# Patient Record
Sex: FEMALE | Race: WHITE | Employment: UNEMPLOYED | URBAN - METROPOLITAN AREA
[De-identification: names, ages, dates, MRNs, and addresses within clinical notes are randomized per-mention and may not be internally consistent; named-entity substitution may affect disease eponyms.]

---

## 2018-01-01 ENCOUNTER — HOSPITAL ENCOUNTER (OUTPATIENT)
Dept: INPATIENT UNIT | Age: 83
Setting detail: THERAPIES SERIES
Discharge: HOME | End: 2018-11-29
Payer: COMMERCIAL

## 2018-01-01 ENCOUNTER — TELEPHONE (OUTPATIENT)
Dept: GASTROENTEROLOGY | Age: 83
End: 2018-01-01

## 2018-01-01 ENCOUNTER — OFFICE VISIT (OUTPATIENT)
Dept: PRIMARY CARE CLINIC | Age: 83
End: 2018-01-01
Payer: COMMERCIAL

## 2018-01-01 ENCOUNTER — TELEPHONE (OUTPATIENT)
Dept: PRIMARY CARE CLINIC | Age: 83
End: 2018-01-01

## 2018-01-01 ENCOUNTER — HOSPITAL ENCOUNTER (OUTPATIENT)
Dept: INPATIENT UNIT | Age: 83
Setting detail: THERAPIES SERIES
Discharge: HOME | End: 2018-12-13
Payer: COMMERCIAL

## 2018-01-01 ENCOUNTER — HOSPITAL ENCOUNTER (INPATIENT)
Age: 83
LOS: 5 days | Discharge: HOME HEALTH | DRG: 682 | End: 2018-12-24
Attending: EMERGENCY MEDICINE | Admitting: INTERNAL MEDICINE
Payer: MEDICARE

## 2018-01-01 ENCOUNTER — APPOINTMENT (OUTPATIENT)
Dept: ULTRASOUND IMAGING | Age: 83
DRG: 682 | End: 2018-01-01
Payer: MEDICARE

## 2018-01-01 VITALS
BODY MASS INDEX: 15.79 KG/M2 | WEIGHT: 92 LBS | DIASTOLIC BLOOD PRESSURE: 60 MMHG | RESPIRATION RATE: 15 BRPM | OXYGEN SATURATION: 94 % | SYSTOLIC BLOOD PRESSURE: 90 MMHG | HEART RATE: 70 BPM

## 2018-01-01 VITALS
SYSTOLIC BLOOD PRESSURE: 132 MMHG | BODY MASS INDEX: 17.69 KG/M2 | OXYGEN SATURATION: 96 % | HEIGHT: 64 IN | RESPIRATION RATE: 16 BRPM | HEART RATE: 64 BPM | TEMPERATURE: 98.8 F | WEIGHT: 103.62 LBS | DIASTOLIC BLOOD PRESSURE: 68 MMHG

## 2018-01-01 VITALS
RESPIRATION RATE: 18 BRPM | HEART RATE: 70 BPM | OXYGEN SATURATION: 99 % | SYSTOLIC BLOOD PRESSURE: 143 MMHG | TEMPERATURE: 97.2 F | DIASTOLIC BLOOD PRESSURE: 65 MMHG

## 2018-01-01 VITALS
HEART RATE: 73 BPM | DIASTOLIC BLOOD PRESSURE: 62 MMHG | SYSTOLIC BLOOD PRESSURE: 112 MMHG | RESPIRATION RATE: 16 BRPM | WEIGHT: 99 LBS | OXYGEN SATURATION: 96 %

## 2018-01-01 VITALS
HEART RATE: 71 BPM | DIASTOLIC BLOOD PRESSURE: 50 MMHG | OXYGEN SATURATION: 99 % | TEMPERATURE: 98.8 F | SYSTOLIC BLOOD PRESSURE: 112 MMHG | RESPIRATION RATE: 16 BRPM

## 2018-01-01 DIAGNOSIS — E87.5 ACUTE HYPERKALEMIA: ICD-10-CM

## 2018-01-01 DIAGNOSIS — D64.9 ANEMIA, UNSPECIFIED TYPE: ICD-10-CM

## 2018-01-01 DIAGNOSIS — N17.9 ACUTE RENAL FAILURE, UNSPECIFIED ACUTE RENAL FAILURE TYPE (HCC): Primary | ICD-10-CM

## 2018-01-01 DIAGNOSIS — R64 CACHEXIA (HCC): ICD-10-CM

## 2018-01-01 DIAGNOSIS — K92.2 GASTROINTESTINAL HEMORRHAGE, UNSPECIFIED GASTROINTESTINAL HEMORRHAGE TYPE: Primary | ICD-10-CM

## 2018-01-01 LAB
-: ABNORMAL
-: ABNORMAL
ABO/RH: NORMAL
ABSOLUTE EOS #: 0.3 K/UL (ref 0–0.4)
ABSOLUTE IMMATURE GRANULOCYTE: ABNORMAL K/UL (ref 0–0.3)
ABSOLUTE LYMPH #: 1 K/UL (ref 1–4.8)
ABSOLUTE MONO #: 1.2 K/UL (ref 0.1–1.3)
ALBUMIN SERPL-MCNC: 2.1 G/DL (ref 3.5–5.2)
ALBUMIN SERPL-MCNC: 2.3 G/DL (ref 3.5–5.2)
ALBUMIN SERPL-MCNC: 2.4 G/DL (ref 3.5–5.2)
ALBUMIN SERPL-MCNC: 2.4 G/DL (ref 3.5–5.2)
ALBUMIN SERPL-MCNC: 2.5 G/DL (ref 3.5–5.2)
ALBUMIN SERPL-MCNC: 2.7 G/DL (ref 3.5–5.2)
ALBUMIN/GLOBULIN RATIO: ABNORMAL (ref 1–2.5)
ALP BLD-CCNC: 101 U/L (ref 35–104)
ALP BLD-CCNC: 74 U/L (ref 35–104)
ALP BLD-CCNC: 82 U/L (ref 35–104)
ALP BLD-CCNC: 82 U/L (ref 35–104)
ALP BLD-CCNC: 83 U/L (ref 35–104)
ALP BLD-CCNC: 84 U/L (ref 35–104)
ALT SERPL-CCNC: 6 U/L (ref 5–33)
ALT SERPL-CCNC: 7 U/L (ref 5–33)
ALT SERPL-CCNC: 7 U/L (ref 5–33)
ALT SERPL-CCNC: 8 U/L (ref 5–33)
ALT SERPL-CCNC: 8 U/L (ref 5–33)
ALT SERPL-CCNC: 9 U/L (ref 5–33)
AMORPHOUS: ABNORMAL
AMORPHOUS: ABNORMAL
ANCA MYELOPEROXIDASE: 8 AU/ML
ANCA PROTEINASE 3: 11 AU/ML
ANION GAP SERPL CALCULATED.3IONS-SCNC: 10 MMOL/L (ref 9–17)
ANION GAP SERPL CALCULATED.3IONS-SCNC: 11 MMOL/L (ref 9–17)
ANION GAP SERPL CALCULATED.3IONS-SCNC: 9 MMOL/L (ref 9–17)
ANTI-NUCLEAR ANTIBODY (ANA): NEGATIVE
ANTIBODY SCREEN: NEGATIVE
ARM BAND NUMBER: NORMAL
AST SERPL-CCNC: 21 U/L
AST SERPL-CCNC: 21 U/L
AST SERPL-CCNC: 22 U/L
AST SERPL-CCNC: 22 U/L
AST SERPL-CCNC: 23 U/L
AST SERPL-CCNC: 24 U/L
BACTERIA: ABNORMAL
BACTERIA: ABNORMAL
BASOPHILS # BLD: 1 % (ref 0–2)
BASOPHILS ABSOLUTE: 0.1 /ΜL
BASOPHILS ABSOLUTE: 0.1 K/UL (ref 0–0.2)
BASOPHILS RELATIVE PERCENT: 0.6 %
BILIRUB SERPL-MCNC: 0.19 MG/DL (ref 0.3–1.2)
BILIRUB SERPL-MCNC: 0.26 MG/DL (ref 0.3–1.2)
BILIRUB SERPL-MCNC: 0.27 MG/DL (ref 0.3–1.2)
BILIRUB SERPL-MCNC: 0.3 MG/DL (ref 0.3–1.2)
BILIRUB SERPL-MCNC: 0.32 MG/DL (ref 0.3–1.2)
BILIRUB SERPL-MCNC: 0.43 MG/DL (ref 0.3–1.2)
BILIRUBIN URINE: NEGATIVE
BILIRUBIN URINE: NEGATIVE
BLD PROD TYP BPU: NORMAL
BUN BLDV-MCNC: 33 MG/DL (ref 8–23)
BUN BLDV-MCNC: 41 MG/DL (ref 8–23)
BUN BLDV-MCNC: 52 MG/DL (ref 8–23)
BUN BLDV-MCNC: 60 MG/DL (ref 8–23)
BUN BLDV-MCNC: 75 MG/DL (ref 8–23)
BUN BLDV-MCNC: 83 MG/DL (ref 8–23)
BUN BLDV-MCNC: 84 MG/DL
BUN/CREAT BLD: ABNORMAL (ref 9–20)
CALCIUM SERPL-MCNC: 7.7 MG/DL (ref 8.6–10.4)
CALCIUM SERPL-MCNC: 7.7 MG/DL (ref 8.6–10.4)
CALCIUM SERPL-MCNC: 7.8 MG/DL (ref 8.6–10.4)
CALCIUM SERPL-MCNC: 7.9 MG/DL (ref 8.6–10.4)
CALCIUM SERPL-MCNC: 7.9 MG/DL (ref 8.6–10.4)
CALCIUM SERPL-MCNC: 8.3 MG/DL
CALCIUM SERPL-MCNC: 8.3 MG/DL (ref 8.6–10.4)
CASTS UA: ABNORMAL /LPF
CASTS UA: ABNORMAL /LPF
CHLORIDE BLD-SCNC: 108 MMOL/L (ref 98–107)
CHLORIDE BLD-SCNC: 109 MMOL/L
CHLORIDE BLD-SCNC: 109 MMOL/L (ref 98–107)
CHLORIDE BLD-SCNC: 111 MMOL/L (ref 98–107)
CHLORIDE BLD-SCNC: 112 MMOL/L (ref 98–107)
CHLORIDE BLD-SCNC: 113 MMOL/L (ref 98–107)
CHLORIDE BLD-SCNC: 113 MMOL/L (ref 98–107)
CHLORIDE, UR: 90 MMOL/L
CO2: 16 MMOL/L (ref 20–31)
CO2: 18 MMOL/L (ref 20–31)
CO2: 19 MMOL/L (ref 20–31)
CO2: 20 MMOL/L (ref 20–31)
CO2: 23 MMOL/L
CO2: 23 MMOL/L (ref 20–31)
CO2: 23 MMOL/L (ref 20–31)
COLOR: YELLOW
COLOR: YELLOW
COMMENT UA: ABNORMAL
COMMENT UA: ABNORMAL
CREAT SERPL-MCNC: 2.47 MG/DL (ref 0.5–0.9)
CREAT SERPL-MCNC: 2.55 MG/DL (ref 0.5–0.9)
CREAT SERPL-MCNC: 2.99 MG/DL (ref 0.5–0.9)
CREAT SERPL-MCNC: 3.26 MG/DL (ref 0.5–0.9)
CREAT SERPL-MCNC: 3.81 MG/DL (ref 0.5–0.9)
CREAT SERPL-MCNC: 4.2 MG/DL
CREAT SERPL-MCNC: 4.24 MG/DL (ref 0.5–0.9)
CREATININE URINE: 20.6 MG/DL (ref 28–217)
CREATININE URINE: 33.5 MG/DL (ref 28–217)
CROSSMATCH RESULT: NORMAL
CRYSTALS, UA: ABNORMAL /HPF
CRYSTALS, UA: ABNORMAL /HPF
DATE, STOOL #1: ABNORMAL
DATE, STOOL #2: ABNORMAL
DATE, STOOL #3: ABNORMAL
DIFFERENTIAL TYPE: ABNORMAL
DISPENSE STATUS BLOOD BANK: NORMAL
EKG ATRIAL RATE: 72 BPM
EKG P AXIS: 81 DEGREES
EKG P-R INTERVAL: 172 MS
EKG Q-T INTERVAL: 392 MS
EKG QRS DURATION: 66 MS
EKG QTC CALCULATION (BAZETT): 429 MS
EKG R AXIS: 54 DEGREES
EKG T AXIS: 60 DEGREES
EKG VENTRICULAR RATE: 72 BPM
EOSINOPHILS ABSOLUTE: 0.2 /ΜL
EOSINOPHILS RELATIVE PERCENT: 2.7 %
EOSINOPHILS RELATIVE PERCENT: 3 % (ref 0–4)
EPITHELIAL CELLS UA: ABNORMAL /HPF
EPITHELIAL CELLS UA: ABNORMAL /HPF
EXPIRATION DATE: NORMAL
FERRITIN: 313 UG/L (ref 13–150)
FOLATE: 12.1 NG/ML
GBM ANTIBODY IGG: 1 AU/ML
GFR AFRICAN AMERICAN: 12 ML/MIN
GFR AFRICAN AMERICAN: 13 ML/MIN
GFR AFRICAN AMERICAN: 16 ML/MIN
GFR AFRICAN AMERICAN: 18 ML/MIN
GFR AFRICAN AMERICAN: 21 ML/MIN
GFR AFRICAN AMERICAN: 22 ML/MIN
GFR CALCULATED: 10
GFR NON-AFRICAN AMERICAN: 10 ML/MIN
GFR NON-AFRICAN AMERICAN: 11 ML/MIN
GFR NON-AFRICAN AMERICAN: 13 ML/MIN
GFR NON-AFRICAN AMERICAN: 15 ML/MIN
GFR NON-AFRICAN AMERICAN: 18 ML/MIN
GFR NON-AFRICAN AMERICAN: 18 ML/MIN
GFR SERPL CREATININE-BSD FRML MDRD: ABNORMAL ML/MIN/{1.73_M2}
GLUCOSE BLD-MCNC: 141 MG/DL
GLUCOSE BLD-MCNC: 69 MG/DL (ref 70–99)
GLUCOSE BLD-MCNC: 71 MG/DL (ref 70–99)
GLUCOSE BLD-MCNC: 72 MG/DL (ref 70–99)
GLUCOSE BLD-MCNC: 74 MG/DL (ref 70–99)
GLUCOSE BLD-MCNC: 77 MG/DL (ref 70–99)
GLUCOSE BLD-MCNC: 79 MG/DL (ref 70–99)
GLUCOSE URINE: NEGATIVE
GLUCOSE URINE: NEGATIVE
HAPTOGLOBIN: 247 MG/DL (ref 30–200)
HCT VFR BLD CALC: 20.5 % (ref 36–46)
HCT VFR BLD CALC: 20.9 % (ref 36–46)
HCT VFR BLD CALC: 22.2 % (ref 36–46)
HCT VFR BLD CALC: 24 % (ref 36–46)
HCT VFR BLD CALC: 24.3 % (ref 36–46)
HCT VFR BLD CALC: 24.9 % (ref 36–46)
HCT VFR BLD CALC: 25.1 % (ref 36–46)
HCT VFR BLD CALC: 25.5 % (ref 36–46)
HCT VFR BLD CALC: 25.9 % (ref 36–46)
HCT VFR BLD CALC: 26.9 % (ref 36–46)
HEMOCCULT SP1 STL QL: POSITIVE
HEMOCCULT SP2 STL QL: ABNORMAL
HEMOCCULT SP3 STL QL: ABNORMAL
HEMOGLOBIN: 6.9 G/DL (ref 12–16)
HEMOGLOBIN: 7 G/DL (ref 12–16)
HEMOGLOBIN: 7.1 G/DL (ref 12–16)
HEMOGLOBIN: 7.7 G/DL (ref 12–16)
HEMOGLOBIN: 8.1 G/DL (ref 12–16)
HEMOGLOBIN: 8.3 G/DL (ref 12–16)
HEMOGLOBIN: 8.4 G/DL (ref 12–16)
HEMOGLOBIN: 8.5 G/DL (ref 12–16)
HEMOGLOBIN: 8.7 G/DL (ref 12–16)
HEMOGLOBIN: 9.1 G/DL (ref 12–16)
IMMATURE GRANULOCYTES: ABNORMAL %
INR BLD: 1.1
IRON SATURATION: 65 % (ref 20–55)
IRON: 110 UG/DL (ref 37–145)
KETONES, URINE: NEGATIVE
KETONES, URINE: NEGATIVE
LEUKOCYTE ESTERASE, URINE: ABNORMAL
LEUKOCYTE ESTERASE, URINE: ABNORMAL
LYMPHOCYTES # BLD: 12 % (ref 24–44)
LYMPHOCYTES ABSOLUTE: 0.9 /ΜL
LYMPHOCYTES RELATIVE PERCENT: 10.6 %
MAGNESIUM: 1.4 MG/DL (ref 1.6–2.6)
MAGNESIUM: 1.9 MG/DL (ref 1.6–2.6)
MCH RBC QN AUTO: 32.6 PG (ref 26–34)
MCH RBC QN AUTO: 33.2 PG (ref 26–34)
MCH RBC QN AUTO: 33.4 PG (ref 26–34)
MCH RBC QN AUTO: 33.6 PG
MCH RBC QN AUTO: 33.6 PG (ref 26–34)
MCH RBC QN AUTO: 34.7 PG (ref 26–34)
MCH RBC QN AUTO: 34.8 PG (ref 26–34)
MCHC RBC AUTO-ENTMCNC: 30.9 G/DL
MCHC RBC AUTO-ENTMCNC: 32.2 G/DL (ref 31–37)
MCHC RBC AUTO-ENTMCNC: 32.8 G/DL (ref 31–37)
MCHC RBC AUTO-ENTMCNC: 33.7 G/DL (ref 31–37)
MCHC RBC AUTO-ENTMCNC: 33.7 G/DL (ref 31–37)
MCHC RBC AUTO-ENTMCNC: 34 G/DL (ref 31–37)
MCHC RBC AUTO-ENTMCNC: 34.5 G/DL (ref 31–37)
MCV RBC AUTO: 102.2 FL (ref 80–100)
MCV RBC AUTO: 103.4 FL (ref 80–100)
MCV RBC AUTO: 105.5 FL (ref 80–100)
MCV RBC AUTO: 108.7 FL
MCV RBC AUTO: 96.9 FL (ref 80–100)
MCV RBC AUTO: 97.4 FL (ref 80–100)
MCV RBC AUTO: 98.6 FL (ref 80–100)
MONOCYTES # BLD: 15 % (ref 1–7)
MONOCYTES ABSOLUTE: 0.9 /ΜL
MONOCYTES RELATIVE PERCENT: 9.8 %
MUCUS: ABNORMAL
MUCUS: ABNORMAL
NEUTROPHILS ABSOLUTE: 6.5 /ΜL
NEUTROPHILS RELATIVE PERCENT: 75.4 %
NITRITE, URINE: NEGATIVE
NITRITE, URINE: NEGATIVE
NRBC AUTOMATED: ABNORMAL PER 100 WBC
OTHER OBSERVATIONS UA: ABNORMAL
OTHER OBSERVATIONS UA: ABNORMAL
PDW BLD-RTO: 19.8 % (ref 11.5–14.9)
PDW BLD-RTO: 19.9 % (ref 11.5–14.9)
PDW BLD-RTO: 20.4 % (ref 11.5–14.9)
PDW BLD-RTO: 20.4 % (ref 11.5–14.9)
PDW BLD-RTO: 20.7 % (ref 11.5–14.9)
PDW BLD-RTO: 21.2 % (ref 11.5–14.9)
PDW BLD-RTO: ABNORMAL %
PH UA: 6 (ref 5–8)
PH UA: 7 (ref 5–8)
PLATELET # BLD: 171 K/UL (ref 150–450)
PLATELET # BLD: 175 K/UL (ref 150–450)
PLATELET # BLD: 192 K/UL (ref 150–450)
PLATELET # BLD: 200 K/UL (ref 150–450)
PLATELET # BLD: 205 K/UL (ref 150–450)
PLATELET # BLD: 228 K/ΜL
PLATELET # BLD: 250 K/UL (ref 150–450)
PLATELET ESTIMATE: ABNORMAL
PMV BLD AUTO: 6.9 FL (ref 6–12)
PMV BLD AUTO: 7.1 FL (ref 6–12)
PMV BLD AUTO: 7.1 FL (ref 6–12)
PMV BLD AUTO: 7.2 FL (ref 6–12)
PMV BLD AUTO: 7.3 FL (ref 6–12)
PMV BLD AUTO: 7.4 FL (ref 6–12)
PMV BLD AUTO: 9.9 FL
POTASSIUM SERPL-SCNC: 3.4 MMOL/L (ref 3.7–5.3)
POTASSIUM SERPL-SCNC: 3.6 MMOL/L (ref 3.7–5.3)
POTASSIUM SERPL-SCNC: 4.4 MMOL/L (ref 3.7–5.3)
POTASSIUM SERPL-SCNC: 4.7 MMOL/L (ref 3.7–5.3)
POTASSIUM SERPL-SCNC: 5.1 MMOL/L (ref 3.7–5.3)
POTASSIUM SERPL-SCNC: 5.9 MMOL/L
POTASSIUM SERPL-SCNC: 5.9 MMOL/L (ref 3.7–5.3)
POTASSIUM, UR: 16.7 MMOL/L
POTASSIUM, UR: 36.9 MMOL/L
PROTEIN UA: ABNORMAL
PROTEIN UA: ABNORMAL
PROTHROMBIN TIME: 11.1 SEC (ref 9.7–12)
RBC # BLD: 1.98 M/UL (ref 4–5.2)
RBC # BLD: 2.01 M/UL (ref 4–5.2)
RBC # BLD: 2.29 10^6/ΜL
RBC # BLD: 2.43 M/UL (ref 4–5.2)
RBC # BLD: 2.46 M/UL (ref 4–5.2)
RBC # BLD: 2.63 M/UL (ref 4–5.2)
RBC # BLD: 2.78 M/UL (ref 4–5.2)
RBC # BLD: ABNORMAL 10*6/UL
RBC UA: ABNORMAL /HPF
RBC UA: ABNORMAL /HPF
RENAL EPITHELIAL, UA: ABNORMAL /HPF
RENAL EPITHELIAL, UA: ABNORMAL /HPF
SEG NEUTROPHILS: 69 % (ref 36–66)
SEGMENTED NEUTROPHILS ABSOLUTE COUNT: 5.8 K/UL (ref 1.3–9.1)
SODIUM BLD-SCNC: 138 MMOL/L
SODIUM BLD-SCNC: 139 MMOL/L (ref 135–144)
SODIUM BLD-SCNC: 139 MMOL/L (ref 135–144)
SODIUM BLD-SCNC: 140 MMOL/L (ref 135–144)
SODIUM BLD-SCNC: 141 MMOL/L (ref 135–144)
SODIUM BLD-SCNC: 142 MMOL/L (ref 135–144)
SODIUM BLD-SCNC: 144 MMOL/L (ref 135–144)
SODIUM,UR: 74 MMOL/L
SODIUM,UR: 98 MMOL/L
SPECIFIC GRAVITY UA: 1.01 (ref 1–1.03)
SPECIFIC GRAVITY UA: 1.01 (ref 1–1.03)
THYROXINE, FREE: 0.86 NG/DL (ref 0.93–1.7)
TIME, STOOL #1: ABNORMAL
TIME, STOOL #2: ABNORMAL
TIME, STOOL #3: ABNORMAL
TOTAL CK: 28 U/L (ref 26–192)
TOTAL IRON BINDING CAPACITY: 170 UG/DL (ref 250–450)
TOTAL PROTEIN: 7.7 G/DL (ref 6.4–8.3)
TOTAL PROTEIN: 7.8 G/DL (ref 6.4–8.3)
TOTAL PROTEIN: 8.1 G/DL (ref 6.4–8.3)
TOTAL PROTEIN: 8.4 G/DL (ref 6.4–8.3)
TOTAL PROTEIN: 8.5 G/DL (ref 6.4–8.3)
TOTAL PROTEIN: 9.3 G/DL (ref 6.4–8.3)
TRANSFUSION STATUS: NORMAL
TRICHOMONAS: ABNORMAL
TRICHOMONAS: ABNORMAL
TROPONIN INTERP: ABNORMAL
TROPONIN INTERP: ABNORMAL
TROPONIN T: ABNORMAL NG/ML
TROPONIN T: ABNORMAL NG/ML
TROPONIN, HIGH SENSITIVITY: 89 NG/L (ref 0–14)
TROPONIN, HIGH SENSITIVITY: 96 NG/L (ref 0–14)
TSH SERPL DL<=0.05 MIU/L-ACNC: 8.39 MIU/L (ref 0.3–5)
TURBIDITY: ABNORMAL
TURBIDITY: CLEAR
UNIT DIVISION: 0
UNIT NUMBER: NORMAL
UNSATURATED IRON BINDING CAPACITY: 60 UG/DL (ref 112–347)
URINE HGB: ABNORMAL
URINE HGB: ABNORMAL
UROBILINOGEN, URINE: NORMAL
UROBILINOGEN, URINE: NORMAL
VITAMIN B-12: 1331 PG/ML (ref 232–1245)
WBC # BLD: 7.7 K/UL (ref 3.5–11)
WBC # BLD: 7.8 K/UL (ref 3.5–11)
WBC # BLD: 7.8 K/UL (ref 3.5–11)
WBC # BLD: 7.9 K/UL (ref 3.5–11)
WBC # BLD: 8.2 K/UL (ref 3.5–11)
WBC # BLD: 8.3 K/UL (ref 3.5–11)
WBC # BLD: 8.6 10^3/ML
WBC # BLD: ABNORMAL 10*3/UL
WBC UA: ABNORMAL /HPF
WBC UA: ABNORMAL /HPF
YEAST: ABNORMAL
YEAST: ABNORMAL

## 2018-01-01 PROCEDURE — 97535 SELF CARE MNGMENT TRAINING: CPT

## 2018-01-01 PROCEDURE — 85027 COMPLETE CBC AUTOMATED: CPT

## 2018-01-01 PROCEDURE — 84443 ASSAY THYROID STIM HORMONE: CPT

## 2018-01-01 PROCEDURE — 84300 ASSAY OF URINE SODIUM: CPT

## 2018-01-01 PROCEDURE — 36415 COLL VENOUS BLD VENIPUNCTURE: CPT

## 2018-01-01 PROCEDURE — 83735 ASSAY OF MAGNESIUM: CPT

## 2018-01-01 PROCEDURE — 99232 SBSQ HOSP IP/OBS MODERATE 35: CPT | Performed by: INTERNAL MEDICINE

## 2018-01-01 PROCEDURE — 82436 ASSAY OF URINE CHLORIDE: CPT

## 2018-01-01 PROCEDURE — G8978 MOBILITY CURRENT STATUS: HCPCS

## 2018-01-01 PROCEDURE — 99495 TRANSJ CARE MGMT MOD F2F 14D: CPT | Performed by: NURSE PRACTITIONER

## 2018-01-01 PROCEDURE — 1111F DSCHRG MED/CURRENT MED MERGE: CPT | Performed by: NURSE PRACTITIONER

## 2018-01-01 PROCEDURE — 80053 COMPREHEN METABOLIC PANEL: CPT

## 2018-01-01 PROCEDURE — 83550 IRON BINDING TEST: CPT

## 2018-01-01 PROCEDURE — 97110 THERAPEUTIC EXERCISES: CPT

## 2018-01-01 PROCEDURE — 2580000003 HC RX 258: Performed by: INTERNAL MEDICINE

## 2018-01-01 PROCEDURE — 86920 COMPATIBILITY TEST SPIN: CPT

## 2018-01-01 PROCEDURE — 97165 OT EVAL LOW COMPLEX 30 MIN: CPT

## 2018-01-01 PROCEDURE — 6370000000 HC RX 637 (ALT 250 FOR IP): Performed by: NURSE PRACTITIONER

## 2018-01-01 PROCEDURE — 85018 HEMOGLOBIN: CPT

## 2018-01-01 PROCEDURE — 6360000002 HC RX W HCPCS: Performed by: INTERNAL MEDICINE

## 2018-01-01 PROCEDURE — 86900 BLOOD TYPING SEROLOGIC ABO: CPT

## 2018-01-01 PROCEDURE — P9016 RBC LEUKOCYTES REDUCED: HCPCS

## 2018-01-01 PROCEDURE — 2580000003 HC RX 258: Performed by: EMERGENCY MEDICINE

## 2018-01-01 PROCEDURE — 83010 ASSAY OF HAPTOGLOBIN QUANT: CPT

## 2018-01-01 PROCEDURE — 76775 US EXAM ABDO BACK WALL LIM: CPT

## 2018-01-01 PROCEDURE — 2500000003 HC RX 250 WO HCPCS: Performed by: INTERNAL MEDICINE

## 2018-01-01 PROCEDURE — 82570 ASSAY OF URINE CREATININE: CPT

## 2018-01-01 PROCEDURE — 2060000000 HC ICU INTERMEDIATE R&B

## 2018-01-01 PROCEDURE — 6370000000 HC RX 637 (ALT 250 FOR IP): Performed by: EMERGENCY MEDICINE

## 2018-01-01 PROCEDURE — 36430 TRANSFUSION BLD/BLD COMPNT: CPT

## 2018-01-01 PROCEDURE — 97530 THERAPEUTIC ACTIVITIES: CPT

## 2018-01-01 PROCEDURE — 82746 ASSAY OF FOLIC ACID SERUM: CPT

## 2018-01-01 PROCEDURE — 6370000000 HC RX 637 (ALT 250 FOR IP): Performed by: INTERNAL MEDICINE

## 2018-01-01 PROCEDURE — 86850 RBC ANTIBODY SCREEN: CPT

## 2018-01-01 PROCEDURE — 83540 ASSAY OF IRON: CPT

## 2018-01-01 PROCEDURE — 85014 HEMATOCRIT: CPT

## 2018-01-01 PROCEDURE — 99284 EMERGENCY DEPT VISIT MOD MDM: CPT

## 2018-01-01 PROCEDURE — 97116 GAIT TRAINING THERAPY: CPT

## 2018-01-01 PROCEDURE — 2580000003 HC RX 258: Performed by: NURSE PRACTITIONER

## 2018-01-01 PROCEDURE — 99223 1ST HOSP IP/OBS HIGH 75: CPT | Performed by: INTERNAL MEDICINE

## 2018-01-01 PROCEDURE — 86901 BLOOD TYPING SEROLOGIC RH(D): CPT

## 2018-01-01 PROCEDURE — 1200000000 HC SEMI PRIVATE

## 2018-01-01 PROCEDURE — 99239 HOSP IP/OBS DSCHRG MGMT >30: CPT | Performed by: INTERNAL MEDICINE

## 2018-01-01 PROCEDURE — 81001 URINALYSIS AUTO W/SCOPE: CPT

## 2018-01-01 PROCEDURE — 85610 PROTHROMBIN TIME: CPT

## 2018-01-01 PROCEDURE — 97161 PT EVAL LOW COMPLEX 20 MIN: CPT

## 2018-01-01 PROCEDURE — 99233 SBSQ HOSP IP/OBS HIGH 50: CPT | Performed by: INTERNAL MEDICINE

## 2018-01-01 PROCEDURE — 84133 ASSAY OF URINE POTASSIUM: CPT

## 2018-01-01 PROCEDURE — 82607 VITAMIN B-12: CPT

## 2018-01-01 PROCEDURE — 85025 COMPLETE CBC W/AUTO DIFF WBC: CPT

## 2018-01-01 PROCEDURE — G8979 MOBILITY GOAL STATUS: HCPCS

## 2018-01-01 PROCEDURE — 86038 ANTINUCLEAR ANTIBODIES: CPT

## 2018-01-01 PROCEDURE — 82728 ASSAY OF FERRITIN: CPT

## 2018-01-01 PROCEDURE — 51702 INSERT TEMP BLADDER CATH: CPT

## 2018-01-01 PROCEDURE — 99213 OFFICE O/P EST LOW 20 MIN: CPT | Performed by: NURSE PRACTITIONER

## 2018-01-01 PROCEDURE — 84439 ASSAY OF FREE THYROXINE: CPT

## 2018-01-01 PROCEDURE — 99222 1ST HOSP IP/OBS MODERATE 55: CPT | Performed by: INTERNAL MEDICINE

## 2018-01-01 PROCEDURE — 84484 ASSAY OF TROPONIN QUANT: CPT

## 2018-01-01 PROCEDURE — G0328 FECAL BLOOD SCRN IMMUNOASSAY: HCPCS

## 2018-01-01 PROCEDURE — 82550 ASSAY OF CK (CPK): CPT

## 2018-01-01 PROCEDURE — 83516 IMMUNOASSAY NONANTIBODY: CPT

## 2018-01-01 PROCEDURE — 93005 ELECTROCARDIOGRAM TRACING: CPT

## 2018-01-01 RX ORDER — LANOLIN ALCOHOL/MO/W.PET/CERES
1000 CREAM (GRAM) TOPICAL EVERY OTHER DAY
Status: DISCONTINUED | OUTPATIENT
Start: 2018-01-01 | End: 2018-01-01 | Stop reason: HOSPADM

## 2018-01-01 RX ORDER — SODIUM CHLORIDE 0.9 % (FLUSH) 0.9 %
10 SYRINGE (ML) INJECTION EVERY 12 HOURS SCHEDULED
Status: DISCONTINUED | OUTPATIENT
Start: 2018-01-01 | End: 2018-01-01 | Stop reason: HOSPADM

## 2018-01-01 RX ORDER — NICOTINE 21 MG/24HR
1 PATCH, TRANSDERMAL 24 HOURS TRANSDERMAL DAILY PRN
Status: DISCONTINUED | OUTPATIENT
Start: 2018-01-01 | End: 2018-01-01 | Stop reason: CLARIF

## 2018-01-01 RX ORDER — 0.9 % SODIUM CHLORIDE 0.9 %
250 INTRAVENOUS SOLUTION INTRAVENOUS ONCE
Status: COMPLETED | OUTPATIENT
Start: 2018-01-01 | End: 2018-01-01

## 2018-01-01 RX ORDER — SODIUM CHLORIDE 0.9 % (FLUSH) 0.9 %
10 SYRINGE (ML) INJECTION PRN
Status: DISCONTINUED | OUTPATIENT
Start: 2018-01-01 | End: 2018-01-01 | Stop reason: HOSPADM

## 2018-01-01 RX ORDER — AMINO ACIDS/PROTEIN HYDROLYS 15G-100/30
30 LIQUID (ML) ORAL 2 TIMES DAILY
Status: DISCONTINUED | OUTPATIENT
Start: 2018-01-01 | End: 2018-01-01

## 2018-01-01 RX ORDER — SODIUM CHLORIDE 450 MG/100ML
INJECTION, SOLUTION INTRAVENOUS CONTINUOUS
Status: DISCONTINUED | OUTPATIENT
Start: 2018-01-01 | End: 2018-01-01 | Stop reason: HOSPADM

## 2018-01-01 RX ORDER — ONDANSETRON 2 MG/ML
4 INJECTION INTRAMUSCULAR; INTRAVENOUS EVERY 6 HOURS PRN
Status: DISCONTINUED | OUTPATIENT
Start: 2018-01-01 | End: 2018-01-01 | Stop reason: HOSPADM

## 2018-01-01 RX ORDER — HEPARIN SODIUM 5000 [USP'U]/ML
5000 INJECTION, SOLUTION INTRAVENOUS; SUBCUTANEOUS 2 TIMES DAILY
Status: DISCONTINUED | OUTPATIENT
Start: 2018-01-01 | End: 2018-01-01

## 2018-01-01 RX ORDER — LEVOTHYROXINE SODIUM 0.03 MG/1
25 TABLET ORAL DAILY
Status: DISCONTINUED | OUTPATIENT
Start: 2018-01-01 | End: 2018-01-01 | Stop reason: HOSPADM

## 2018-01-01 RX ORDER — SENNA PLUS 8.6 MG/1
1 TABLET ORAL 2 TIMES DAILY
Status: DISCONTINUED | OUTPATIENT
Start: 2018-01-01 | End: 2018-01-01 | Stop reason: HOSPADM

## 2018-01-01 RX ORDER — DEXTROSE MONOHYDRATE 25 G/50ML
25 INJECTION, SOLUTION INTRAVENOUS ONCE
Status: COMPLETED | OUTPATIENT
Start: 2018-01-01 | End: 2018-01-01

## 2018-01-01 RX ORDER — ACETAMINOPHEN 325 MG/1
650 TABLET ORAL EVERY 4 HOURS PRN
Status: DISCONTINUED | OUTPATIENT
Start: 2018-01-01 | End: 2018-01-01 | Stop reason: HOSPADM

## 2018-01-01 RX ORDER — LISINOPRIL 20 MG/1
20 TABLET ORAL DAILY
Status: DISCONTINUED | OUTPATIENT
Start: 2018-01-01 | End: 2018-01-01

## 2018-01-01 RX ORDER — MAGNESIUM SULFATE 1 G/100ML
1 INJECTION INTRAVENOUS ONCE
Status: COMPLETED | OUTPATIENT
Start: 2018-01-01 | End: 2018-01-01

## 2018-01-01 RX ORDER — SENNA PLUS 8.6 MG/1
1 TABLET ORAL 2 TIMES DAILY
COMMUNITY

## 2018-01-01 RX ORDER — SODIUM POLYSTYRENE SULFONATE 15 G/60ML
15 SUSPENSION ORAL; RECTAL
Status: DISCONTINUED | OUTPATIENT
Start: 2018-01-01 | End: 2018-01-01

## 2018-01-01 RX ORDER — AMINO ACIDS/PROTEIN HYDROLYS 15G-100/30
30 LIQUID (ML) ORAL 2 TIMES DAILY
Status: DISCONTINUED | OUTPATIENT
Start: 2018-01-01 | End: 2018-01-01 | Stop reason: RX

## 2018-01-01 RX ORDER — LEVOTHYROXINE SODIUM 0.03 MG/1
25 TABLET ORAL DAILY
Qty: 30 TABLET | Refills: 3 | Status: SHIPPED | OUTPATIENT
Start: 2018-01-01

## 2018-01-01 RX ORDER — MEMANTINE HYDROCHLORIDE 5 MG/1
5 TABLET ORAL 2 TIMES DAILY
Status: DISCONTINUED | OUTPATIENT
Start: 2018-01-01 | End: 2018-01-01 | Stop reason: HOSPADM

## 2018-01-01 RX ORDER — BRIMONIDINE TARTRATE 2 MG/ML
1 SOLUTION/ DROPS OPHTHALMIC 2 TIMES DAILY
Status: DISCONTINUED | OUTPATIENT
Start: 2018-01-01 | End: 2018-01-01 | Stop reason: HOSPADM

## 2018-01-01 RX ORDER — 0.9 % SODIUM CHLORIDE 0.9 %
250 INTRAVENOUS SOLUTION INTRAVENOUS ONCE
Status: DISCONTINUED | OUTPATIENT
Start: 2018-01-01 | End: 2018-01-01 | Stop reason: HOSPADM

## 2018-01-01 RX ORDER — SODIUM CHLORIDE 9 MG/ML
INJECTION, SOLUTION INTRAVENOUS CONTINUOUS
Status: DISCONTINUED | OUTPATIENT
Start: 2018-01-01 | End: 2018-01-01

## 2018-01-01 RX ORDER — LANOLIN ALCOHOL/MO/W.PET/CERES
100 CREAM (GRAM) TOPICAL DAILY
Status: DISCONTINUED | OUTPATIENT
Start: 2018-01-01 | End: 2018-01-01

## 2018-01-01 RX ORDER — AMINO ACIDS/PROTEIN HYDROLYS 15G-100/30
30 LIQUID (ML) ORAL 2 TIMES DAILY
COMMUNITY

## 2018-01-01 RX ORDER — CIPROFLOXACIN 500 MG/1
500 TABLET, FILM COATED ORAL 2 TIMES DAILY
Qty: 10 TABLET | Refills: 0 | Status: SHIPPED | OUTPATIENT
Start: 2018-01-01 | End: 2018-01-01

## 2018-01-01 RX ORDER — POTASSIUM CHLORIDE 20MEQ/15ML
40 LIQUID (ML) ORAL ONCE
Status: COMPLETED | OUTPATIENT
Start: 2018-01-01 | End: 2018-01-01

## 2018-01-01 RX ORDER — DOCUSATE SODIUM 100 MG/1
100 CAPSULE, LIQUID FILLED ORAL 2 TIMES DAILY
Status: DISCONTINUED | OUTPATIENT
Start: 2018-01-01 | End: 2018-01-01 | Stop reason: HOSPADM

## 2018-01-01 RX ORDER — LANOLIN ALCOHOL/MO/W.PET/CERES
100 CREAM (GRAM) TOPICAL DAILY
Status: ON HOLD | COMMUNITY
End: 2018-01-01 | Stop reason: HOSPADM

## 2018-01-01 RX ORDER — LATANOPROST 50 UG/ML
1 SOLUTION/ DROPS OPHTHALMIC NIGHTLY
Status: DISCONTINUED | OUTPATIENT
Start: 2018-01-01 | End: 2018-01-01 | Stop reason: HOSPADM

## 2018-01-01 RX ADMIN — DOCUSATE SODIUM 100 MG: 100 CAPSULE, LIQUID FILLED ORAL at 11:27

## 2018-01-01 RX ADMIN — MEMANTINE HYDROCHLORIDE 5 MG: 5 TABLET ORAL at 22:17

## 2018-01-01 RX ADMIN — SODIUM CHLORIDE: 9 INJECTION, SOLUTION INTRAVENOUS at 18:50

## 2018-01-01 RX ADMIN — SENNOSIDES 8.6 MG: 8.6 TABLET, FILM COATED ORAL at 09:18

## 2018-01-01 RX ADMIN — BRIMONIDINE TARTRATE 1 DROP: 2 SOLUTION OPHTHALMIC at 21:35

## 2018-01-01 RX ADMIN — SODIUM CHLORIDE: 4.5 INJECTION, SOLUTION INTRAVENOUS at 10:59

## 2018-01-01 RX ADMIN — SENNOSIDES 8.6 MG: 8.6 TABLET, FILM COATED ORAL at 21:35

## 2018-01-01 RX ADMIN — LATANOPROST 1 DROP: 50 SOLUTION OPHTHALMIC at 21:35

## 2018-01-01 RX ADMIN — SENNOSIDES 8.6 MG: 8.6 TABLET, FILM COATED ORAL at 20:21

## 2018-01-01 RX ADMIN — SODIUM CHLORIDE 250 ML: 9 INJECTION, SOLUTION INTRAVENOUS at 11:08

## 2018-01-01 RX ADMIN — SODIUM CHLORIDE: 4.5 INJECTION, SOLUTION INTRAVENOUS at 14:21

## 2018-01-01 RX ADMIN — DOCUSATE SODIUM 100 MG: 100 CAPSULE, LIQUID FILLED ORAL at 11:01

## 2018-01-01 RX ADMIN — DOCUSATE SODIUM 100 MG: 100 CAPSULE, LIQUID FILLED ORAL at 09:15

## 2018-01-01 RX ADMIN — SODIUM CHLORIDE: 9 INJECTION, SOLUTION INTRAVENOUS at 14:12

## 2018-01-01 RX ADMIN — Medication: at 09:27

## 2018-01-01 RX ADMIN — DOCUSATE SODIUM 100 MG: 100 CAPSULE, LIQUID FILLED ORAL at 20:21

## 2018-01-01 RX ADMIN — SODIUM CHLORIDE 250 ML: 9 INJECTION, SOLUTION INTRAVENOUS at 10:43

## 2018-01-01 RX ADMIN — MEMANTINE HYDROCHLORIDE 5 MG: 5 TABLET ORAL at 11:27

## 2018-01-01 RX ADMIN — SODIUM CHLORIDE: 9 INJECTION, SOLUTION INTRAVENOUS at 20:55

## 2018-01-01 RX ADMIN — DOCUSATE SODIUM 100 MG: 100 CAPSULE, LIQUID FILLED ORAL at 00:14

## 2018-01-01 RX ADMIN — BRIMONIDINE TARTRATE 1 DROP: 2 SOLUTION OPHTHALMIC at 09:15

## 2018-01-01 RX ADMIN — SENNOSIDES 8.6 MG: 8.6 TABLET, FILM COATED ORAL at 22:17

## 2018-01-01 RX ADMIN — Medication: at 09:19

## 2018-01-01 RX ADMIN — LATANOPROST 1 DROP: 50 SOLUTION OPHTHALMIC at 20:21

## 2018-01-01 RX ADMIN — MEMANTINE HYDROCHLORIDE 5 MG: 5 TABLET ORAL at 09:14

## 2018-01-01 RX ADMIN — BRIMONIDINE TARTRATE 1 DROP: 2 SOLUTION OPHTHALMIC at 09:10

## 2018-01-01 RX ADMIN — CYANOCOBALAMIN TAB 1000 MCG 1000 MCG: 1000 TAB at 11:27

## 2018-01-01 RX ADMIN — SENNOSIDES 8.6 MG: 8.6 TABLET, FILM COATED ORAL at 11:27

## 2018-01-01 RX ADMIN — HEPARIN SODIUM 5000 UNITS: 5000 INJECTION, SOLUTION INTRAVENOUS; SUBCUTANEOUS at 22:38

## 2018-01-01 RX ADMIN — BRIMONIDINE TARTRATE 1 DROP: 2 SOLUTION OPHTHALMIC at 00:16

## 2018-01-01 RX ADMIN — MEMANTINE HYDROCHLORIDE 5 MG: 5 TABLET ORAL at 11:03

## 2018-01-01 RX ADMIN — HEPARIN SODIUM 5000 UNITS: 5000 INJECTION, SOLUTION INTRAVENOUS; SUBCUTANEOUS at 11:01

## 2018-01-01 RX ADMIN — BRIMONIDINE TARTRATE 1 DROP: 2 SOLUTION OPHTHALMIC at 20:54

## 2018-01-01 RX ADMIN — HEPARIN SODIUM 5000 UNITS: 5000 INJECTION, SOLUTION INTRAVENOUS; SUBCUTANEOUS at 22:21

## 2018-01-01 RX ADMIN — Medication 6 UNITS: at 20:53

## 2018-01-01 RX ADMIN — BRIMONIDINE TARTRATE 1 DROP: 2 SOLUTION OPHTHALMIC at 11:27

## 2018-01-01 RX ADMIN — CYANOCOBALAMIN TAB 1000 MCG 1000 MCG: 1000 TAB at 09:10

## 2018-01-01 RX ADMIN — CEFTRIAXONE SODIUM 1 G: 1 INJECTION, POWDER, FOR SOLUTION INTRAMUSCULAR; INTRAVENOUS at 13:34

## 2018-01-01 RX ADMIN — LATANOPROST 1 DROP: 50 SOLUTION OPHTHALMIC at 20:54

## 2018-01-01 RX ADMIN — POTASSIUM CHLORIDE 40 MEQ: 40 SOLUTION ORAL at 11:00

## 2018-01-01 RX ADMIN — DOCUSATE SODIUM 100 MG: 100 CAPSULE, LIQUID FILLED ORAL at 09:18

## 2018-01-01 RX ADMIN — LEVOTHYROXINE SODIUM 25 MCG: 25 TABLET ORAL at 14:21

## 2018-01-01 RX ADMIN — SENNOSIDES 8.6 MG: 8.6 TABLET, FILM COATED ORAL at 09:10

## 2018-01-01 RX ADMIN — DOCUSATE SODIUM 100 MG: 100 CAPSULE, LIQUID FILLED ORAL at 09:10

## 2018-01-01 RX ADMIN — SENNOSIDES 8.6 MG: 8.6 TABLET, FILM COATED ORAL at 00:14

## 2018-01-01 RX ADMIN — LATANOPROST 1 DROP: 50 SOLUTION OPHTHALMIC at 00:09

## 2018-01-01 RX ADMIN — MEMANTINE HYDROCHLORIDE 5 MG: 5 TABLET ORAL at 09:10

## 2018-01-01 RX ADMIN — LEVOTHYROXINE SODIUM 25 MCG: 25 TABLET ORAL at 06:11

## 2018-01-01 RX ADMIN — Medication: at 21:35

## 2018-01-01 RX ADMIN — SENNOSIDES 8.6 MG: 8.6 TABLET, FILM COATED ORAL at 20:54

## 2018-01-01 RX ADMIN — Medication: at 22:19

## 2018-01-01 RX ADMIN — BRIMONIDINE TARTRATE 1 DROP: 2 SOLUTION OPHTHALMIC at 22:20

## 2018-01-01 RX ADMIN — SENNOSIDES 8.6 MG: 8.6 TABLET, FILM COATED ORAL at 11:01

## 2018-01-01 RX ADMIN — MAGNESIUM SULFATE HEPTAHYDRATE 1 G: 1 INJECTION, SOLUTION INTRAVENOUS at 11:00

## 2018-01-01 RX ADMIN — CYANOCOBALAMIN TAB 1000 MCG 1000 MCG: 1000 TAB at 11:01

## 2018-01-01 RX ADMIN — LATANOPROST 1 DROP: 50 SOLUTION OPHTHALMIC at 22:18

## 2018-01-01 RX ADMIN — DOCUSATE SODIUM 100 MG: 100 CAPSULE, LIQUID FILLED ORAL at 21:35

## 2018-01-01 RX ADMIN — BRIMONIDINE TARTRATE 1 DROP: 2 SOLUTION OPHTHALMIC at 09:18

## 2018-01-01 RX ADMIN — DEXTROSE MONOHYDRATE 25 G: 25 INJECTION, SOLUTION INTRAVENOUS at 20:55

## 2018-01-01 RX ADMIN — DOCUSATE SODIUM 100 MG: 100 CAPSULE, LIQUID FILLED ORAL at 22:17

## 2018-01-01 RX ADMIN — SENNOSIDES 8.6 MG: 8.6 TABLET, FILM COATED ORAL at 09:15

## 2018-01-01 RX ADMIN — DOCUSATE SODIUM 100 MG: 100 CAPSULE, LIQUID FILLED ORAL at 20:54

## 2018-01-01 RX ADMIN — Medication 400 MG: at 11:00

## 2018-01-01 RX ADMIN — MEMANTINE HYDROCHLORIDE 5 MG: 5 TABLET ORAL at 20:55

## 2018-01-01 RX ADMIN — BRIMONIDINE TARTRATE 1 DROP: 2 SOLUTION OPHTHALMIC at 11:10

## 2018-01-01 RX ADMIN — CEFTRIAXONE SODIUM 1 G: 1 INJECTION, POWDER, FOR SOLUTION INTRAMUSCULAR; INTRAVENOUS at 12:46

## 2018-01-01 RX ADMIN — MEMANTINE HYDROCHLORIDE 5 MG: 5 TABLET ORAL at 21:35

## 2018-01-01 RX ADMIN — SODIUM CHLORIDE: 9 INJECTION, SOLUTION INTRAVENOUS at 03:05

## 2018-01-01 RX ADMIN — BRIMONIDINE TARTRATE 1 DROP: 2 SOLUTION OPHTHALMIC at 20:21

## 2018-01-01 RX ADMIN — MEMANTINE HYDROCHLORIDE 5 MG: 5 TABLET ORAL at 20:21

## 2018-01-01 RX ADMIN — MEMANTINE HYDROCHLORIDE 5 MG: 5 TABLET ORAL at 09:19

## 2018-01-01 ASSESSMENT — ENCOUNTER SYMPTOMS
SORE THROAT: 0
DIARRHEA: 0
SHORTNESS OF BREATH: 0
NAUSEA: 0
COUGH: 0
SHORTNESS OF BREATH: 0
DIARRHEA: 0
SHORTNESS OF BREATH: 0
DIARRHEA: 0
SORE THROAT: 0
WHEEZING: 0
BACK PAIN: 0
WHEEZING: 0
VOMITING: 0
CONSTIPATION: 0
CONSTIPATION: 0
SORE THROAT: 0
COUGH: 0
CONSTIPATION: 0
COUGH: 0
ABDOMINAL PAIN: 0
VOMITING: 0
NAUSEA: 0
SHORTNESS OF BREATH: 0
VOMITING: 0
ABDOMINAL PAIN: 0
WHEEZING: 0
BACK PAIN: 0
COUGH: 0
VOMITING: 0
SORE THROAT: 0
WHEEZING: 0
COUGH: 0
NAUSEA: 0
ABDOMINAL PAIN: 0
CONSTIPATION: 0
SHORTNESS OF BREATH: 0
NAUSEA: 0
ABDOMINAL PAIN: 0
BACK PAIN: 0
CONSTIPATION: 0
NAUSEA: 0
DIARRHEA: 0
DIARRHEA: 0
BACK PAIN: 0
BACK PAIN: 1

## 2018-01-01 ASSESSMENT — PAIN SCALES - GENERAL
PAINLEVEL_OUTOF10: 0

## 2018-11-12 PROBLEM — R15.9 INCONTINENCE OF FECES: Status: ACTIVE | Noted: 2018-01-01

## 2018-11-12 PROBLEM — R64 CACHEXIA (HCC): Status: ACTIVE | Noted: 2018-01-01

## 2018-11-12 PROBLEM — F03.90 DEMENTIA WITHOUT BEHAVIORAL DISTURBANCE (HCC): Status: ACTIVE | Noted: 2018-01-01

## 2018-11-12 PROBLEM — R32 URINARY INCONTINENCE: Status: ACTIVE | Noted: 2018-01-01

## 2018-11-19 PROBLEM — L89.302 PRESSURE INJURY OF BUTTOCK, STAGE 2 (HCC): Status: ACTIVE | Noted: 2018-01-01

## 2018-12-13 NOTE — PROGRESS NOTES
Patient arrives for blood transfusion, vitals obtained, IV#20 RFA, blood transfusion begins at 1050, vitals per protocol, complete at 1300, pt tolerated well. Waited 1 hour then H&H was drawn, HGB is 8.5, per order parameters do not transfuse second unit. IV removed and pt was discharged via wheelchair with her daughter, report was called to [address] [place] [address] Leonora at [facility].

## 2018-12-17 NOTE — PROGRESS NOTES
[address] [address] PRIMARY [address] [address]  Dept: [phone]    Rosmery Weber is a 80 y.o. female who presents today for her medical conditions/complaintsas noted below. Chief Complaint   Patient presents with    Follow-up     Patient is here for a 1 month cognition and weight check. She will eat on and off. Sometimes if she does not eat she will be drinking that causes her not wanting to eat.  Other     Patient is having issues with sleeping. Would like to discuss melatonin. She use to take it when she was living on her own. HPI:     HPI  She is eating fairly well - on and off  She is drinking shake with meals and if she is drinking well not as hungry. Drinks fill her up. Unsure about sleeping habits - she may be up a couple times during the night. She has had blood transfusions 11/19, 11/29 and 12/13. No results found for: LDLCHOLESTEROL, LDLCALC    (goal LDL is <100)   No results found for: AST, ALT, BUN  BP Readings from Last 3 Encounters:   12/17/18 112/62   12/13/18 (!) 143/65   11/29/18 (!) 112/50          (goal 120/80)    No past medical history on file. Past Surgical History:   Procedure Laterality Date    EYE SURGERY      HIP ARTHROPLASTY         No family history on file.     Social History   Substance Use Topics    Smoking status: Never Smoker    Smokeless tobacco: Never Used    Alcohol use Yes      Current Outpatient Prescriptions   Medication Sig Dispense Refill    Cranberry-Vitamin C-Inulin (UTI-STAT) LIQD Take 30 mLs by mouth 2 times daily      senna (SENOKOT) 8.6 MG tablet Take 1 tablet by mouth 2 times daily      memantine (NAMENDA) 5 MG tablet Take 1 tablet by mouth 2 times daily 60 tablet 3    vitamin B-12 (CYANOCOBALAMIN) 1000 MCG tablet Take 1,000 mcg by mouth every other day       latanoprost (XALATAN) 0.005 % ophthalmic solution Place 1 drop into both eyes nightly  brimonidine (ALPHAGAN) 0.2 % ophthalmic solution Place 1 drop into both eyes 2 times daily       No current facility-administered medications for this visit. No Known Allergies    Health Maintenance   Topic Date Due    DTaP/Tdap/Td vaccine (1 - Tdap) 08/05/1946    Shingles Vaccine (1 of 2 - 2 Dose Series) 08/05/1977    Pneumococcal low/med risk (1 of 2 - PCV13) 08/05/1992    Flu vaccine  Completed       Subjective:      Review of Systems   Constitutional: Positive for fatigue. Negative for chills and fever. HENT: Positive for hearing loss. Negative for congestion and nosebleeds. Respiratory: Negative for cough and shortness of breath. Cardiovascular: Negative for chest pain, palpitations and leg swelling. Gastrointestinal: Negative for constipation, diarrhea and nausea. Genitourinary: Negative for difficulty urinating and dysuria. Musculoskeletal: Positive for back pain and gait problem. Skin: Negative for rash and wound. Neurological: Negative for light-headedness and headaches. Psychiatric/Behavioral: Positive for confusion and sleep disturbance. Negative for dysphoric mood. The patient is not nervous/anxious. Objective:     /62   Pulse 73   Resp 16   Wt 99 lb (44.9 kg)   SpO2 96%   Physical Exam   Constitutional: Vital signs are normal. She appears cachectic. HENT:   Head: Normocephalic and atraumatic. Right Ear: External ear normal.   Left Ear: External ear normal.   Eyes: Pupils are equal, round, and reactive to light. EOM are normal.   Neck: Normal range of motion. Neck supple. No thyromegaly present. Cardiovascular: Normal rate and regular rhythm. No murmur heard. Pulmonary/Chest: Effort normal and breath sounds normal.   Abdominal: Soft. Bowel sounds are normal.   Neurological: She is alert. Skin: Skin is warm and dry. Capillary refill takes less than 2 seconds. Psychiatric: She has a normal mood and affect.  Her behavior is normal. Cognition and memory are impaired. She exhibits abnormal recent memory and abnormal remote memory. Nursing note and vitals reviewed. Assessment:       Diagnosis Orders   1. Gastrointestinal hemorrhage, unspecified gastrointestinal hemorrhage type     2. Cachexia ([address]          Plan:    Weekly weight  Recheck CBC on Wed  Call Dr. Uzma Ruvalcaba and ask for appt. To be moved up  Return in about 2 months (around 2/17/2019) for post colonoscopy . No orders of the defined types were placed in this encounter. No orders of the defined types were placed in this encounter. Patient given educationalmaterials - see patient instructions. Discussed use, benefit, and side effectsof prescribed medications. All patient questions answered. Pt voiced understanding. Reviewed health maintenance. Instructed to continue current medications, diet andexercise. Patient agreed with treatment plan. Follow up as directed.      Electronicallysigned by JELENA Hawk CNP on 12/17/2018 at 10:04 PM

## 2018-12-19 PROBLEM — R77.8 ELEVATED TROPONIN: Status: ACTIVE | Noted: 2018-01-01

## 2018-12-19 PROBLEM — E86.0 DEHYDRATION: Status: ACTIVE | Noted: 2018-01-01

## 2018-12-19 PROBLEM — N17.9 ACUTE RENAL FAILURE (HCC): Status: ACTIVE | Noted: 2018-01-01

## 2018-12-20 NOTE — CARE COORDINATION
CASE MANAGEMENT NOTE:    Admission Date:  12/19/2018 Shireen Stone is a 80 y.o.  female    Admitted for : Acute renal failure ([facility] [address] [N17.9]    Met with:  Family - Spoke with son Ester Mariee on the phone    PCP:  Jaylan Castillo                                Insurance:  [employer]      Current Residence/ Living Arrangements:  in nursing home - Came to us from [place] [address] and he would like her to return there. She is normally from Assisted Living    Is the Patient an TOYA FITZPATRICK [facility] [place] with Readmission Risk Score greater than 14%? No  If yes, pt needs a follow up appointment made within 7 days. Family Members/Caregivers that pt would like involved in their care:    Yes    If yes, list name here:  Mart Mariee    Transportation Provider:  Family             Is patient in Isolation/One on One/Altered Mental Status? Yes  If yes, skip next question. If no, would they like an I-Pad to  use? NA  If yes, call [phone]. Discharge Plan:  12/20: [facility] - Patient is from [address] [place] [address] and mart Mariee would like her to return there - LSW to follow. Renee inserted for retention, renal US, IV fluids. Will continue to follow.  CONG NEEDS SIGNED/COMPLETED. SnapMy[employer]                 Electronically signed by: Ata Flores RN on 12/20/2018 at 11:09 AM

## 2018-12-20 NOTE — FLOWSHEET NOTE
12/19/18 1940   Encounter Summary   Services provided to: Patient   Referral/Consult From: [facility]  (Son and daughter present and attentive)   Continue Visiting (12/19/18)   Complexity of Encounter Low   Length of Encounter 15 minutes   Routine   Type Initial   Assessment Approachable  (Patient could not tell writer where she lives; she resides at [facility])   Intervention Active listening;Explored coping resources;Prayer;Sustaining presence/ Ministry of presence   Outcome Expressed gratitude;Receptive

## 2018-12-21 NOTE — PROGRESS NOTES
[facility]    INPATIENT OCCUPATIONAL THERAPY  PROGRESS NOTE  Date: 2018  Patient Name: Olga Hernandez      Room:   MRN: [id number]    : 1927  (80 y.o.) Gender: female     Discharge Recommendations:  Home with Home health OT, 24 hour supervision or assist (return to detention)       Referring Practitioner: Dr. Renetta Keyes  Diagnosis: Acute renal failure  General  Chart Reviewed: Yes  Patient assessed for rehabilitation services?: Yes  Family / Caregiver Present: No  Referring Practitioner: Dr. Renetta Keyes  Diagnosis: Acute renal failure    Restrictions  Restrictions/Precautions: Fall Risk  Implants present? : Metal implants (R NAATSHA)  Required Braces or Orthoses?: No      Subjective  Subjective: \"Really tired. \" when asked how shes feeling  Comments: Pt pleasant. To have blood transfusion after therapies this date. Bedside tx only 2* anticipated blood transfusion. Patient Currently in Pain: Denies          Objective  Cognition  Overall Cognitive Status: WFL  Bed mobility  Rolling to Right: Modified independent  Supine to Sit: Modified independent (use of rail)  Sit to Supine:  (remained EOB upon PT dept arrival)  Scooting: Modified independent (to edge of bed with rail)  Balance  Sitting Balance: Modified independent   Standing Balance: Unable to assess(comment) (CGA tx prior; did not occur 2* anticipated blood transfusion)         ADL  Feeding: Setup  Grooming: Setup  UE Bathing: Stand by assistance  LE Bathing: Moderate assistance (A below knees and pericare)  UE Dressing: Stand by assistance;Minimal assistance (A c RUE 2* IV place, G tech/initiation noted)  LE Dressing: Moderate assistance;Dependent/Total (standing held until after blood transfusion)  Toileting: Moderate assistance;Dependent/Total (muñoz cath/brief; able to A c pericare seated upright)  Additional Comments: Full ADL from EOB this date for task promotion and overall stregnthening.  Initial VC for posture and positioning while

## 2018-12-21 NOTE — PLAN OF CARE
Problem: Risk for Impaired Skin Integrity  Goal: Tissue integrity - skin and mucous membranes  Structural intactness and normal physiological function of skin and  mucous membranes. Outcome: Ongoing  Skin assessment completed, heels elevated, and patient repositioned q2, barrier cream applied when needed. Patient's skin integrity remained intact.

## 2018-12-21 NOTE — PROGRESS NOTES
complications (free text); Patient limited by fatigue (Hgb 6.9 receiving blood shortly, bedside tx only)  Activity Tolerance: Pt very lethargic and falling asleep during tx. PT Equipment Recommendations  Equipment Needed: No       Assessment  Activity Tolerance: Treatment limited secondary to medical complications (free text); Patient limited by fatigue (Hgb 6.9 receiving blood shortly, bedside tx only)   Body structures, Functions, Activity limitations: Decreased functional mobility ; Decreased strength;Decreased balance;Decreased endurance  Discharge Recommendations:  (Assisted Living)     Type of devices: Call light within reach; Bed alarm in place; Left in bed;Nurse notified (SUNITA Tong notified)     Plan  Times per week: 5-7x/wk  Current Treatment Recommendations: Strengthening, Balance Training, Functional Mobility Training, Endurance Training, Gait Training    Patient Education  New Education Provided: POC  Learner:patient  Method: demonstration and explanation       Outcome: needs reinforcement     Goals  Short term goals  Time Frame for Short term goals: 3-4 days  Short term goal 1: transfers SBA  Short term goal 2: Gait SBA with RW x 50-100ft    PT Individual Minutes  Time In: 5415  Time Out: 1036  Minutes: 20    Electronically signed by Pedro Riley PTA on 12/21/18 at 10:51 AM

## 2018-12-21 NOTE — CARE COORDINATION
DISCHARGE PLANNING NOTE:    Plan is for this patient to return to [facility] per deepa Ennis. Will continue to follow along.     Electronically signed by Monika Mercer RN on 12/21/2018 at 10:19 AM

## 2018-12-21 NOTE — H&P
Provider, MD   memantine (NAMENDA) 5 MG tablet Take 1 tablet by mouth 2 times daily 11/19/18  Yes JELENA Rodriguez CNP   vitamin B-12 (CYANOCOBALAMIN) 1000 MCG tablet Take 1,000 mcg by mouth every other day    Yes Historical Provider, MD   latanoprost (XALATAN) 0.005 % ophthalmic solution Place 1 drop into both eyes nightly   Yes Historical Provider, MD   brimonidine (ALPHAGAN) 0.2 % ophthalmic solution Place 1 drop into both eyes 2 times daily   Yes Historical Provider, MD   Cranberry-Vitamin C-Inulin (UTI-STAT) LIQD Take 30 mLs by mouth 2 times daily    Historical Provider, MD       ALLERGIES      Patient has no known allergies. REVIEW OF SYSTEMS     Review of Systems   Constitutional: Negative for chills and diaphoresis. HENT: Negative for congestion and sore throat. Respiratory: Negative for cough, shortness of breath and wheezing. Cardiovascular: Negative for chest pain, palpitations and leg swelling. Gastrointestinal: Negative for abdominal pain, constipation, diarrhea, nausea and vomiting. Genitourinary: Positive for difficulty urinating (Renee catheter placed. ). Negative for dysuria and frequency. Musculoskeletal: Negative for back pain and myalgias. Neurological: Negative for dizziness, weakness and headaches. Psychiatric/Behavioral: The patient is not nervous/anxious. PHYSICAL EXAM      BP (!) 119/53   Pulse 66   Temp 97.5 °F (36.4 °C) (Oral)   Resp 14   Ht 5' 4\" (1.626 m)   Wt 100 lb (45.4 kg)   SpO2 99%   BMI 17.16 kg/m²  Body mass index is 17.16 kg/m². Physical Exam   Constitutional: She appears well-developed. HENT:   Head: Normocephalic. Mouth/Throat: Oropharynx is clear and moist.   Eyes: Pupils are equal, round, and reactive to light. Conjunctivae and EOM are normal.   Neck: Normal range of motion. Neck supple. Cardiovascular: Normal rate and regular rhythm. Exam reveals no gallop and no friction rub. No murmur heard.   Pulmonary/Chest: Effort normal and breath sounds normal. She has no wheezes. Abdominal: Soft. Bowel sounds are normal.   Genitourinary:   Genitourinary Comments: Renee Catheter   Musculoskeletal: Normal range of motion. Neurological: She is alert. Confused; disoriented to all spheres   Skin: Skin is warm and dry. Psychiatric: She has a normal mood and affect. Judgment normal.     DIAGNOSTICS      EKG: Component Results     Component Value Ref Range & Units Status Collected Lab   Ventricular Rate 72  BPM Preliminary 12/19/2018  8:17    Atrial Rate 72  BPM Preliminary 12/19/2018  8:17    P-R Interval 172  ms Preliminary 12/19/2018  8:17    QRS Duration 66  ms Preliminary 12/19/2018  8:17    Q-T Interval 392  ms Preliminary 12/19/2018  8:17    QTc Calculation (Bazett) 429  ms Preliminary 12/19/2018  8:17    P [place] 81  degrees Preliminary 12/19/2018  8:17    R [place] 54  degrees Preliminary 12/19/2018  8:17    T [place] 60  degrees Preliminary 12/19/2018  8:17    Testing Performed By     Lab - Abbreviation Name Director Address Valid Date Range   259-Unknown [facility] MUSE Unknown Unknown 12/03/13 1719-Present   Narrative     Sinus rhythm with marked sinus arrhythmia  Otherwise normal ECG  When compared with ECG of 22-MAY-2003 09:22,  No significant change was found     Labs:  CBC:   Recent Labs      12/19/18 1925 12/20/18   0544  12/20/18   1202  12/21/18   0525   WBC  8.3  7.8   --   7.7   HGB  8.1*  7.0*  7.1*  6.9*   PLT  250  205   --   200     BMP:    Recent Labs      12/19/18 1925 12/20/18   0544  12/21/18   0525   NA  139  139  140   K  5.9*  5.1  4.7   CL  109*  111*  113*   CO2  20  18*  16*   BUN  83*  75*  60*   CREATININE  4.24*  3.81*  3.26*   GLUCOSE  71  77  69*     S. Calcium:  Recent Labs      12/21/18   0525   CALCIUM  7.7*     S. Ionized Calcium:No results for input(s): IONCA in the last 72 hours.   S. Magnesium:  Recent Labs      12/20/18   0544   MG  1.9 treatment plan  -DNR-CC per paperwork from [facility]    Recent admit for GI bleed  -Hgb 8.1   --monitor CBC and for signs of bleeding       12/21   hb 6.9   will transfuse 1 u prbc        Dropped hb  From 8 to 6.9    ?  Still bleeding   gi to see   has ckd  And chr anemia also       ckd 4   cr better   nephrology seeing    on ivf    Renal us no obstruction

## 2018-12-22 NOTE — CONSULTS
Morphology NOT REPORTED     RBC Morphology NOT REPORTED     Platelet Estimate NOT REPORTED     Seg Neutrophils 69 (H) 36 - 66 %    Lymphocytes 12 (L) 24 - 44 %    Monocytes 15 (H) 1 - 7 %    Eosinophils % 3 0 - 4 %    Basophils 1 0 - 2 %    Segs Absolute 5.80 1.3 - 9.1 k/uL    Absolute Lymph # 1.00 1.0 - 4.8 k/uL    Absolute Mono # 1.20 0.1 - 1.3 k/uL    Absolute Eos # 0.30 0.0 - 0.4 k/uL    Basophils # 0.10 0.0 - 0.2 k/uL   Comprehensive Metabolic Panel   Result Value Ref Range    Glucose 71 70 - 99 mg/dL    BUN 83 (H) 8 - 23 mg/dL    CREATININE 4.24 (H) 0.50 - 0.90 mg/dL    Bun/Cre Ratio NOT REPORTED 9 - 20    Calcium 8.3 (L) 8.6 - 10.4 mg/dL    Sodium 139 135 - 144 mmol/L    Potassium 5.9 (H) 3.7 - 5.3 mmol/L    Chloride 109 (H) 98 - 107 mmol/L    CO2 20 20 - 31 mmol/L    Anion Gap 10 9 - 17 mmol/L    Alkaline Phosphatase 101 35 - 104 U/L    ALT 9 5 - 33 U/L    AST 22 <32 U/L    Total Bilirubin 0.26 (L) 0.3 - 1.2 mg/dL    Total Protein 9.3 (H) 6.4 - 8.3 g/dL    Alb 2.7 (L) 3.5 - 5.2 g/dL    Albumin/Globulin Ratio NOT REPORTED 1.0 - 2.5    GFR Non-African American 10 (L) >60 mL/min    GFR  12 (L) >60 mL/min    GFR Comment          GFR Staging NOT REPORTED    SODIUM, URINE, RANDOM   Result Value Ref Range    Sodium,Ur 74 mmol/L   POTASSIUM, URINE, RANDOM   Result Value Ref Range    Potassium, Ur 36.9 mmol/L   CREATININE, RANDOM URINE   Result Value Ref Range    Creatinine, Ur 33.5 28.0 - 217.0 mg/dL   Troponin   Result Value Ref Range    Troponin, High Sensitivity 96 (HH) 0 - 14 ng/L    Troponin T NOT REPORTED <0.03 ng/mL    Troponin Interp NOT REPORTED    Troponin   Result Value Ref Range    Troponin, High Sensitivity 89 (HH) 0 - 14 ng/L    Troponin T NOT REPORTED <0.03 ng/mL    Troponin Interp NOT REPORTED    Comprehensive Metabolic Panel w/ Reflex to MG   Result Value Ref Range    Glucose 77 70 - 99 mg/dL    BUN 75 (H) 8 - 23 mg/dL    CREATININE 3.81 (H) 0.50 - 0.90 mg/dL    Bun/Cre Ratio NOT

## 2018-12-22 NOTE — PROGRESS NOTES
results found for: C3  C4: No results found for: C4  MPO ANCA:  No results found for: MPO . PR3 ANCA:  No results found for: PR3  Urine Sodium:    Lab Results   Component Value Date    HOLDEN 98 12/22/2018      Urine Potassium:    Lab Results   Component Value Date    KUR 16.7 12/22/2018     Urine Chloride:  No components found for: CLU  Urine Ph:  No components found for: PO4U  Urine Osmolarity:  No results found for: OSMOU  Urine Creatinine:    Lab Results   Component Value Date    LABCREA 20.6 12/22/2018     Urine Eosinophils: No components found for: EOSU  Urine Protein:  No results found for: TPU  Urinalysis:  U/A:   Lab Results   Component Value Date    NITRU NEGATIVE 12/22/2018    COLORU YELLOW 12/22/2018    PHUR 6.0 12/22/2018    WBCUA 20 TO 50 12/22/2018    RBCUA 50  12/22/2018    MUCUS NOT REPORTED 12/22/2018    TRICHOMONAS NOT REPORTED 12/22/2018    YEAST NOT REPORTED 12/22/2018    BACTERIA FEW 12/22/2018    SPECGRAV 1.009 12/22/2018    LEUKOCYTESUR MOD 12/22/2018    UROBILINOGEN Normal 12/22/2018    BILIRUBINUR NEGATIVE 12/22/2018    GLUCOSEU NEGATIVE 12/22/2018    [place] Seen NEGATIVE 12/22/2018    AMORPHOUS NOT REPORTED 12/22/2018         Radiology:  Reviewed as available. Assessment:  1. Acute kidney injury related to prerenal azotemia from poor oral intake Leading to ischemic ATN , complicated by urinary retention-currently nonoliguric and improvement in urine output , serum creatinine improving  Renal ultrasound negative for any obstruction consistent with renal parenchymal echogenicity chronic medical renal disease  2. Acute hyperkalemia secondary to decreased GFR, urinary retention-improved  3. Hyperchloremic metabolic acidosis  4. Severe protein energy malnutrition  5. Acute on chronic anemia-status post 1 unit of red blood cell transfusion, hemoglobin improved iron stores adequate  Hematology oncology consulted.   6.  Microscopic hematuria most likely secondary to traumatic from Renee

## 2018-12-22 NOTE — CONSULTS
PO4U  Urine Osmolarity:  No results found for: OSMOU  Urine Creatinine:    Lab Results   Component Value Date    LABCREA 33.5 12/19/2018     Urine Eosinophils: No components found for: EOSU  Urine Protein:  No results found for: TPU  Urinalysis:  U/A: No results found for: [place], PHUR, LABCAST, [address], RBCUA, MUCUS, TRICHOMONAS, YEAST, BACTERIA, CLARITYU, SPECGRAV, LEUKOCYTESUR, UROBILINOGEN, BILIRUBINUR, BLOODU, GLUCOSEU, [address], AMORPHOUS      Radiology:  Reviewed as available. Assessment:  1. Acute kidney injury related to prerenal azotemia from poor oral intake and follow complicated by urinary retention-currently nonoliguric and improvement in urine output  2. Acute hyperkalemia secondary to decreased GFR, urinary retention-improved  3. Hyperchloremic metabolic acidosis  4. Severe protein energy malnutrition  5. Acute on chronic anemia-status post 1 unit of red blood cell transfusion       Plan:  Change to IV bicarbonate drip at 100 mL per hour  Check a UA, urine electrolytes   renal ultrasound  Strict I's and O's-keep the Renee catheter in  Encourage timed oral fluid intake 4-5 times per day  Possibly underlying chronic kidney disease stage IV for which we have no baseline-check PTH  Check iron studies and if replete, we'll give her  aranesp  possibly for  anemia of CK D  DNR-CC    Thank you for the consultation.       Electronically signed by Jhonathan Bernardo MD on 12/21/2018 at 7:03 PM

## 2018-12-22 NOTE — H&P
heard.  Pulmonary/Chest: Effort normal and breath sounds normal. She has no wheezes. Abdominal: Soft. Bowel sounds are normal.   Genitourinary:   Genitourinary Comments: Renee Catheter   Musculoskeletal: Normal range of motion. Neurological: She is alert. Confused; disoriented to all spheres   Skin: Skin is warm and dry. Psychiatric: She has a normal mood and affect. Judgment normal.     DIAGNOSTICS      EKG: Component Results     Component Value Ref Range & Units Status Collected Lab   Ventricular Rate 72  BPM Preliminary 12/19/2018  8:17    Atrial Rate 72  BPM Preliminary 12/19/2018  8:17    P-R Interval 172  ms Preliminary 12/19/2018  8:17    QRS Duration 66  ms Preliminary 12/19/2018  8:17    Q-T Interval 392  ms Preliminary 12/19/2018  8:17    QTc Calculation (Bazett) 429  ms Preliminary 12/19/2018  8:17    P [place] 81  degrees Preliminary 12/19/2018  8:17    R [place] 54  degrees Preliminary 12/19/2018  8:17    T [place] 60  degrees Preliminary 12/19/2018  8:17    Testing Performed By     Lab - Abbreviation Name Director Address Valid Date Range   259-Unknown [facility] MUSE Unknown Unknown 12/03/13 1719-Present   Narrative     Sinus rhythm with marked sinus arrhythmia  Otherwise normal ECG  When compared with ECG of 22-MAY-2003 09:22,  No significant change was found     Labs:  CBC:   Recent Labs      12/20/18   0544  12/20/18   1202  12/21/18   0525  12/22/18   0549   WBC  7.8   --   7.7  7.8   HGB  7.0*  7.1*  6.9*  9.1*   PLT  205   --   200  192     BMP:    Recent Labs      12/20/18   0544  12/21/18   0525  12/22/18   0549   NA  139  140  142   K  5.1  4.7  4.4   CL  111*  113*  113*   CO2  18*  16*  19*   BUN  75*  60*  52*   CREATININE  3.81*  3.26*  2.99*   GLUCOSE  77  69*  74     S. Calcium:  Recent Labs      12/22/18   0549   CALCIUM  7.9*     S. Ionized Calcium:No results for input(s): IONCA in the last 72 hours.   S. Magnesium:  Recent Labs

## 2018-12-22 NOTE — CONSULTS
Inpatient consult to GI  Consult performed by: Pete Ramirez  Consult ordered by: Genny Alcantar           GI Consult Note:    Name: Mendoza Marquez  MRN: [id number]     Acct: [de-identified]  Room: 2099/2099-01    Admit Date: 12/19/2018  PCP: JELENA Lin CNP    Physician Requesting Consult: Emilie Macdonald MD     Reason for Consult:  occult positive stools ×1  Anemia  Malnutrition  History weight loss    Chief Complaint:     Chief Complaint   Patient presents with    Other     Hemoglobin 7.7       History Obtained From:     Patient and EMR    History of Present Illness:      Mendoza Marquez is a  80 y.o.  female who presents with Other (Hemoglobin 7.7)    This elderly patient is seen with the presence of nursing staff in the room  Currently no family is available  She has been admitted with some weakness fatigue tiredness and apparently anemia  She is not  good historian and is forgetful  Eyes any overt bleeding or any melena  She is found to be positive for occult blood  He denies any significant abdominal pain  No nausea vomiting  As weight loss  Is  malnourished  Cardiac nursing staff not eating much  Symptoms:  Onset:  Location:  abdomen  Duration:  day(s)  Severity:  mild  Quality:  stable      Past Medical History:     Past Medical History:   Diagnosis Date    Anemia     Dementia     Glaucoma     Other symbolic dysfunctions         Past Surgical History:     Past Surgical History:   Procedure Laterality Date    EYE SURGERY      HIP ARTHROPLASTY          Medications Prior to Admission:       Prior to Admission medications    Medication Sig Start Date End Date Taking?  Authorizing Provider   niacin 250 MG extended release capsule Take 100 mg by mouth daily   Yes Historical Provider, MD   senna (SENOKOT) 8.6 MG tablet Take 1 tablet by mouth 2 times daily   Yes Historical Provider, MD   memantine (NAMENDA) 5 MG tablet Take 1 tablet by mouth 2 times daily 11/19/18  Yes JELENA Lin acute distress  Mental status - oriented to person,with anxious affect  Head - normocephalic and atraumatic  Eyes - pupils equal and reactive, extraocular eye movements intact, conjunctiva clear  Ears - hearing appears to be intact  Nose - no drainage noted  Mouth - mucous membranes moist  Neck - supple, no carotid bruits, thyroid not palpable  Chest - clear to auscultation, normal effort  Heart - normal rate, regular rhythm, no murmurs  Abdomen - soft, No tenderness, nondistended, bowel sounds present all four quadrants, no masses, hepatomegaly or splenomegaly.  No hernias  Neurological - normal speech,   Extremities - peripheral pulses palpable, no pedal edema or calf pain with palpation  Skin -positive rashes, or induration noted  Cranial Nerves : Not done  Lymph nodes: Not done    Data:   CBC:   Lab Results   Component Value Date    WBC 7.8 12/22/2018    RBC 2.78 12/22/2018    HGB 9.1 12/22/2018    HCT 26.9 12/22/2018    MCV 96.9 12/22/2018    MCH 32.6 12/22/2018    MCHC 33.7 12/22/2018    RDW 21.2 12/22/2018     12/22/2018    MPV 6.9 12/22/2018     CBC with Differential:    Lab Results   Component Value Date    WBC 7.8 12/22/2018    RBC 2.78 12/22/2018    HGB 9.1 12/22/2018    HCT 26.9 12/22/2018     12/22/2018    MCV 96.9 12/22/2018    MCH 32.6 12/22/2018    MCHC 33.7 12/22/2018    RDW 21.2 12/22/2018    LYMPHOPCT 12 12/19/2018    LYMPHOPCT 10.6 12/19/2018    MONOPCT 15 12/19/2018    MONOPCT 9.8 12/19/2018    EOSPCT 2.7 12/19/2018    BASOPCT 1 12/19/2018    BASOPCT 0.6 12/19/2018    MONOSABS 1.20 12/19/2018    MONOSABS 0.9 12/19/2018    LYMPHSABS 1.00 12/19/2018    LYMPHSABS 0.9 12/19/2018    EOSABS 0.30 12/19/2018    EOSABS 0.2 12/19/2018    BASOSABS 0.10 12/19/2018    DIFFTYPE NOT REPORTED 12/19/2018     Hemoglobin/Hematocrit:    Lab Results   Component Value Date    HGB 9.1 12/22/2018    HCT 26.9 12/22/2018     CMP:    Lab Results   Component Value Date     12/22/2018    K 4.4 12/22/2018  12/22/2018    CO2 19 12/22/2018    BUN 52 12/22/2018    CREATININE 2.99 12/22/2018    GFRAA 18 12/22/2018    LABGLOM 15 12/22/2018    GLUCOSE 74 12/22/2018    PROT 8.5 12/22/2018    LABALBU 2.4 12/22/2018    CALCIUM 7.9 12/22/2018    BILITOT 0.43 12/22/2018    ALKPHOS 84 12/22/2018    AST 24 12/22/2018    ALT 8 12/22/2018     BMP:    Lab Results   Component Value Date     12/22/2018    K 4.4 12/22/2018     12/22/2018    CO2 19 12/22/2018    BUN 52 12/22/2018    LABALBU 2.4 12/22/2018    CREATININE 2.99 12/22/2018    CALCIUM 7.9 12/22/2018    GFRAA 18 12/22/2018    LABGLOM 15 12/22/2018    GLUCOSE 74 12/22/2018     PT/INR:    Lab Results   Component Value Date    PROTIME 11.1 12/20/2018    INR 1.1 12/20/2018     PTT:  No results found for: APTT, PTT[APTT}    Assesment:     Primary Problem  Acute renal failure [facility])    Active Hospital Problems    Diagnosis Date Noted    Acute renal failure ([facility] [address] [N17.9] 12/19/2018    Elevated troponin [R74.8] 12/19/2018    Dehydration [E86.0] 12/19/2018     occult positive stools ×1  Anemia  Malnutrition  History weight loss      Plan:     1. Supportive care  2. She is not agreeing for any invasive GI workup  3. We'll discuss with family once available  4. He was encouraged to eat  5. Follow-up labs and hemoglobin  6. Benefit from IV iron  7. Some nursing staff  8. Reevaluate in the morning  9. Diet as tolerated        Thank you for allowing me to participate in the care of your patient. Please feel free to contact me with any questions or concerns.      Electronically signed by Seema Steven MD on 12/22/2018 at 4:30 PM     Copy sent to Dr. Ezequiel Sanchez, APRN - CNP

## 2018-12-23 NOTE — PROGRESS NOTES
ophthalmic solution 1 drop BID   latanoprost (XALATAN) 0.005 % ophthalmic solution 1 drop Nightly   memantine (NAMENDA) tablet 5 mg BID   senna (SENOKOT) tablet 8.6 mg BID   vitamin B-12 (CYANOCOBALAMIN) tablet 1,000 mcg Every Other Day   docusate sodium (COLACE) capsule 100 mg BID   ondansetron (ZOFRAN) injection 4 mg Q6H PRN   nicotine (NICODERM CQ) 21 MG/24HR 1 patch Daily PRN       Allergies:  Patient has no known allergies. Social History:   Social History     Social History    Marital status:      Spouse name: N/A    Number of children: N/A    Years of education: N/A     Occupational History    Not on file. Social History Main Topics    Smoking status: Never Smoker    Smokeless tobacco: Never Used    Alcohol use No    Drug use: No    Sexual activity: Not on file     Other Topics Concern    Not on file     Social History Narrative    No narrative on file       Family History:   History reviewed. No pertinent family history. Review of Systems:    Unable to obtain due to dementia      Objective:  CURRENT TEMPERATURE:  Temp: 97.5 °F (36.4 °C)  MAXIMUM TEMPERATURE OVER 24HRS:  Temp (24hrs), Av.7 °F (36.5 °C), Min:97.1 °F (36.2 °C), Max:98.1 °F (36.7 °C)    CURRENT RESPIRATORY RATE:  Resp: 16  CURRENT PULSE:  Pulse: 55  CURRENT BLOOD PRESSURE:  BP: (!) 142/60  24HR BLOOD PRESSURE RANGE:  Systolic (63PHX), CJK:787 , Min:133 , ROBERT:691   ; Diastolic (37GWI), MFY:22, Min:52, Max:62    24HR INTAKE/OUTPUT:      Intake/Output Summary (Last 24 hours) at 18 1409  Last data filed at 18 0505   Gross per 24 hour   Intake              589 ml   Output             2050 ml   Net            -1461 ml     Patient Vitals for the past 96 hrs (Last 3 readings):   Weight   18 0245 100 lb (45.4 kg)   18 2100 100 lb (45.4 kg)   18 1841 100 lb (45.4 kg)         Physical Exam:  GENERAL APPEARANCE: Alert and cooperative, and appears to be in no acute distress.   HEAD: C3: No results found for: C3  C4: No results found for: C4  MPO ANCA:  No results found for: MPO . PR3 ANCA:  No results found for: PR3  Urine Sodium:    Lab Results   Component Value Date    HOLDEN 98 12/22/2018      Urine Potassium:    Lab Results   Component Value Date    KUR 16.7 12/22/2018     Urine Chloride:  No components found for: CLU  Urine Ph:  No components found for: PO4U  Urine Osmolarity:  No results found for: OSMOU  Urine Creatinine:    Lab Results   Component Value Date    LABCREA 20.6 12/22/2018     Urine Eosinophils: No components found for: EOSU  Urine Protein:  No results found for: TPU  Urinalysis:  U/A:   Lab Results   Component Value Date    NITRU NEGATIVE 12/22/2018    COLORU YELLOW 12/22/2018    PHUR 6.0 12/22/2018    WBCUA 20 TO 50 12/22/2018    RBCUA 50  12/22/2018    MUCUS NOT REPORTED 12/22/2018    TRICHOMONAS NOT REPORTED 12/22/2018    YEAST NOT REPORTED 12/22/2018    BACTERIA FEW 12/22/2018    SPECGRAV 1.009 12/22/2018    LEUKOCYTESUR MOD 12/22/2018    UROBILINOGEN Normal 12/22/2018    BILIRUBINUR NEGATIVE 12/22/2018    GLUCOSEU NEGATIVE 12/22/2018    [address] NEGATIVE 12/22/2018    AMORPHOUS NOT REPORTED 12/22/2018         Radiology:  Reviewed as available. Assessment:  1. Acute kidney injury related to prerenal azotemia from poor oral intake Leading to ischemic ATN , complicated by urinary retention-currently nonoliguric and improvement in urine output , serum creatinine improving  Renal ultrasound negative for any obstruction consistent with renal parenchymal echogenicity chronic medical renal disease  2. Acute hyperkalemia secondary to decreased GFR, urinary retention-improved  3. Hyperchloremic metabolic acidosis  4. Severe protein energy malnutrition  5. Acute on chronic anemia-status post 1 unit of red blood cell transfusion, hemoglobin improved iron stores adequate  Hematology oncology consulted.   6.  Microscopic hematuria most likely secondary to traumatic from

## 2018-12-23 NOTE — PROGRESS NOTES
Differential Type NOT REPORTED     Immature Granulocytes NOT REPORTED 0 %    Absolute Immature Granulocyte NOT REPORTED 0.00 - 0.30 k/uL    WBC Morphology NOT REPORTED     RBC Morphology NOT REPORTED     Platelet Estimate NOT REPORTED     Seg Neutrophils 69 (H) 36 - 66 %    Lymphocytes 12 (L) 24 - 44 %    Monocytes 15 (H) 1 - 7 %    Eosinophils % 3 0 - 4 %    Basophils 1 0 - 2 %    Segs Absolute 5.80 1.3 - 9.1 k/uL    Absolute Lymph # 1.00 1.0 - 4.8 k/uL    Absolute Mono # 1.20 0.1 - 1.3 k/uL    Absolute Eos # 0.30 0.0 - 0.4 k/uL    Basophils # 0.10 0.0 - 0.2 k/uL   Comprehensive Metabolic Panel   Result Value Ref Range    Glucose 71 70 - 99 mg/dL    BUN 83 (H) 8 - 23 mg/dL    CREATININE 4.24 (H) 0.50 - 0.90 mg/dL    Bun/Cre Ratio NOT REPORTED 9 - 20    Calcium 8.3 (L) 8.6 - 10.4 mg/dL    Sodium 139 135 - 144 mmol/L    Potassium 5.9 (H) 3.7 - 5.3 mmol/L    Chloride 109 (H) 98 - 107 mmol/L    CO2 20 20 - 31 mmol/L    Anion Gap 10 9 - 17 mmol/L    Alkaline Phosphatase 101 35 - 104 U/L    ALT 9 5 - 33 U/L    AST 22 <32 U/L    Total Bilirubin 0.26 (L) 0.3 - 1.2 mg/dL    Total Protein 9.3 (H) 6.4 - 8.3 g/dL    Alb 2.7 (L) 3.5 - 5.2 g/dL    Albumin/Globulin Ratio NOT REPORTED 1.0 - 2.5    GFR Non-African American 10 (L) >60 mL/min    GFR  12 (L) >60 mL/min    GFR Comment          GFR Staging NOT REPORTED    SODIUM, URINE, RANDOM   Result Value Ref Range    Sodium,Ur 74 mmol/L   POTASSIUM, URINE, RANDOM   Result Value Ref Range    Potassium, Ur 36.9 mmol/L   CREATININE, RANDOM URINE   Result Value Ref Range    Creatinine, Ur 33.5 28.0 - 217.0 mg/dL   Troponin   Result Value Ref Range    Troponin, High Sensitivity 96 (HH) 0 - 14 ng/L    Troponin T NOT REPORTED <0.03 ng/mL    Troponin Interp NOT REPORTED    Troponin   Result Value Ref Range    Troponin, High Sensitivity 89 (HH) 0 - 14 ng/L    Troponin T NOT REPORTED <0.03 ng/mL    Troponin Interp NOT REPORTED    Comprehensive Metabolic Panel w/ Reflex to MG g/dL    Albumin/Globulin Ratio NOT REPORTED 1.0 - 2.5    GFR Non-African American 13 (L) >60 mL/min    GFR  16 (L) >60 mL/min    GFR Comment          GFR Staging NOT REPORTED    CBC   Result Value Ref Range    WBC 7.7 3.5 - 11.0 k/uL    RBC 1.98 (L) 4.0 - 5.2 m/uL    Hemoglobin 6.9 (LL) 12.0 - 16.0 g/dL    Hematocrit 20.9 (L) 36 - 46 %    .5 (H) 80 - 100 fL    MCH 34.7 (H) 26 - 34 pg    MCHC 32.8 31 - 37 g/dL    RDW 20.4 (H) 11.5 - 14.9 %    Platelets 477 909 - 582 k/uL    MPV 7.1 6.0 - 12.0 fL    NRBC Automated NOT REPORTED per 100 WBC   Occ Bld, Fecal Scrn   Result Value Ref Range    Occult Blood, Stool #1 POSITIVE (A) NEG    Date, Stool #1 12/22/18     Time, Stool #1 UNKNOWN     Occult Blood, Stool #2 NOT REPORTED NEG    Date, Stool #2 NOT REPORTED     Time, Stool #2 NOT REPORTED     Occult Blood, Stool #3 NOT REPORTED NEG    Date, Stool #3 NOT REPORTED     Time, Stool #3 NOT REPORTED    Comprehensive Metabolic Panel w/ Reflex to MG   Result Value Ref Range    Glucose 74 70 - 99 mg/dL    BUN 52 (H) 8 - 23 mg/dL    CREATININE 2.99 (H) 0.50 - 0.90 mg/dL    Bun/Cre Ratio NOT REPORTED 9 - 20    Calcium 7.9 (L) 8.6 - 10.4 mg/dL    Sodium 142 135 - 144 mmol/L    Potassium 4.4 3.7 - 5.3 mmol/L    Chloride 113 (H) 98 - 107 mmol/L    CO2 19 (L) 20 - 31 mmol/L    Anion Gap 10 9 - 17 mmol/L    Alkaline Phosphatase 84 35 - 104 U/L    ALT 8 5 - 33 U/L    AST 24 <32 U/L    Total Bilirubin 0.43 0.3 - 1.2 mg/dL    Total Protein 8.5 (H) 6.4 - 8.3 g/dL    Alb 2.4 (L) 3.5 - 5.2 g/dL    Albumin/Globulin Ratio NOT REPORTED 1.0 - 2.5    GFR Non-African American 15 (L) >60 mL/min    GFR  18 (L) >60 mL/min    GFR Comment          GFR Staging NOT REPORTED    CBC   Result Value Ref Range    WBC 7.8 3.5 - 11.0 k/uL    RBC 2.78 (L) 4.0 - 5.2 m/uL    Hemoglobin 9.1 (L) 12.0 - 16.0 g/dL    Hematocrit 26.9 (L) 36 - 46 %    MCV 96.9 80 - 100 fL    MCH 32.6 26 - 34 pg    MCHC 33.7 31 - 37 g/dL    RDW 21.2 Comments NOT REPORTED    Comprehensive Metabolic Panel w/ Reflex to MG   Result Value Ref Range    Glucose 72 70 - 99 mg/dL    BUN 41 (H) 8 - 23 mg/dL    CREATININE 2.55 (H) 0.50 - 0.90 mg/dL    Bun/Cre Ratio NOT REPORTED 9 - 20    Calcium 7.8 (L) 8.6 - 10.4 mg/dL    Sodium 144 135 - 144 mmol/L    Potassium 3.6 (L) 3.7 - 5.3 mmol/L    Chloride 112 (H) 98 - 107 mmol/L    CO2 23 20 - 31 mmol/L    Anion Gap 9 9 - 17 mmol/L    Alkaline Phosphatase 74 35 - 104 U/L    ALT 6 5 - 33 U/L    AST 22 <32 U/L    Total Bilirubin 0.27 (L) 0.3 - 1.2 mg/dL    Total Protein 7.7 6.4 - 8.3 g/dL    Alb 2.4 (L) 3.5 - 5.2 g/dL    Albumin/Globulin Ratio NOT REPORTED 1.0 - 2.5    GFR Non- 18 (L) >60 mL/min    GFR  21 (L) >60 mL/min    GFR Comment          GFR Staging NOT REPORTED    CBC   Result Value Ref Range    WBC 8.2 3.5 - 11.0 k/uL    RBC 2.46 (L) 4.0 - 5.2 m/uL    Hemoglobin 8.3 (L) 12.0 - 16.0 g/dL    Hematocrit 24.0 (L) 36 - 46 %    MCV 97.4 80 - 100 fL    MCH 33.6 26 - 34 pg    MCHC 34.5 31 - 37 g/dL    RDW 19.9 (H) 11.5 - 14.9 %    Platelets 539 162 - 426 k/uL    MPV 7.3 6.0 - 12.0 fL    NRBC Automated NOT REPORTED per 100 WBC   Vitamin B12 & Folate   Result Value Ref Range    Vitamin B-12 1331 (H) 232 - 1245 pg/mL    Folate 12.1 >4.8 ng/mL   Haptoglobin   Result Value Ref Range    Haptoglobin 247 (H) 30 - 200 mg/dL   TSH WITH REFLEX   Result Value Ref Range    TSH 8.39 (H) 0.30 - 5.00 mIU/L   T4, Free   Result Value Ref Range    Thyroxine, Free 0.86 (L) 0.93 - 1.70 ng/dL   Microscopic Urinalysis   Result Value Ref Range    -          WBC, UA 2 TO 5 /HPF    RBC, UA 2 TO 5 /HPF    Casts UA NOT REPORTED /LPF    Crystals UA NOT REPORTED NONE /HPF    Epithelial Cells UA 2 TO 5 /HPF    Renal Epithelial, Urine NOT REPORTED 0 /HPF    Bacteria, UA FEW (A) NONE    Mucus, UA NOT REPORTED NONE    Trichomonas, UA NOT REPORTED NONE    Amorphous, UA NOT REPORTED NONE    Other Observations UA NOT REPORTED NREQ

## 2018-12-23 NOTE — PROGRESS NOTES
GI Progress notes    12/23/2018   12:46 PM    Name:  Tiara Slade  MRN:    [id number]     Acct:     [de-identified]   Room:  2043/204301   Day: 4     Admit Date: 12/19/2018  6:40 PM  PCP: JELENA Carrillo CNP    Subjective:     C/C:   Chief Complaint   Patient presents with    Other     Hemoglobin 7.7       Interval History: Status: improved. Seen wither her son in the room and nursing staff  She does not have any active gi bleeding  Mild drop in HGB is noted  No nausea or any vomiting  Has no sig abd pains  Eating well today     ROS:  Constitutional: negative for chills, fevers and sweats    Gastrointestinal: negative for abdominal pain, constipation, diarrhea, nausea and vomiting      Medications: Allergies: No Known Allergies    Current Meds:   cefTRIAXone (ROCEPHIN) 1 g IVPB in 50 mL D5W minibag Q24H   sodium bicarbonate 75 mEq in sodium chloride 0.45 % 1,000 mL infusion Continuous   sodium chloride flush 0.9 % injection 10 mL 2 times per day   sodium chloride flush 0.9 % injection 10 mL PRN   acetaminophen (TYLENOL) tablet 650 mg Q4H PRN   brimonidine (ALPHAGAN) 0.2 % ophthalmic solution 1 drop BID   latanoprost (XALATAN) 0.005 % ophthalmic solution 1 drop Nightly   memantine (NAMENDA) tablet 5 mg BID   senna (SENOKOT) tablet 8.6 mg BID   vitamin B-12 (CYANOCOBALAMIN) tablet 1,000 mcg Every Other Day   docusate sodium (COLACE) capsule 100 mg BID   ondansetron (ZOFRAN) injection 4 mg Q6H PRN   nicotine (NICODERM CQ) 21 MG/24HR 1 patch Daily PRN       Data:     Code Status:  DNR-CC    History reviewed. No pertinent family history. Social History     Social History    Marital status:      Spouse name: N/A    Number of children: N/A    Years of education: N/A     Occupational History    Not on file.      Social History Main Topics    Smoking status: Never Smoker    Smokeless tobacco: Never Used    Alcohol use No    Drug use: No    Sexual activity: Not on file     Other Topics Concern    Not on file     Social History Narrative    No narrative on file       Vitals:  BP (!) 136/52   Pulse 57   Temp 98.1 °F (36.7 °C) (Axillary)   Resp 16   Ht 5' 4\" (1.626 m)   Wt 100 lb (45.4 kg)   SpO2 96%   BMI 17.16 kg/m²   Temp (24hrs), Av.5 °F (36.4 °C), Min:96.9 °F (36.1 °C), Max:98.1 °F (36.7 °C)    No results for input(s): POCGLU in the last 72 hours. I/O (24Hr):     Intake/Output Summary (Last 24 hours) at 18 1246  Last data filed at 18 0505   Gross per 24 hour   Intake              589 ml   Output             2050 ml   Net            -1461 ml       Labs:      CBC: Lab Results   Component Value Date    WBC 8.2 2018    RBC 2.46 2018    HGB 8.3 2018    HCT 24.0 2018    MCV 97.4 2018    MCH 33.6 2018    MCHC 34.5 2018    RDW 19.9 2018     2018    MPV 7.3 2018     CBC with Differential:  Lab Results   Component Value Date    WBC 8.2 2018    RBC 2.46 2018    HGB 8.3 2018    HCT 24.0 2018     2018    MCV 97.4 2018    MCH 33.6 2018    MCHC 34.5 2018    RDW 19.9 2018    LYMPHOPCT 12 2018    LYMPHOPCT 10.6 2018    MONOPCT 15 2018    MONOPCT 9.8 2018    EOSPCT 2.7 2018    BASOPCT 1 2018    BASOPCT 0.6 2018    MONOSABS 1.20 2018    MONOSABS 0.9 2018    LYMPHSABS 1.00 2018    LYMPHSABS 0.9 2018    EOSABS 0.30 2018    EOSABS 0.2 2018    BASOSABS 0.10 2018    DIFFTYPE NOT REPORTED 2018     Hemoglobin/Hematocrit:  Lab Results   Component Value Date    HGB 8.3 2018    HCT 24.0 2018     CMP:  Lab Results   Component Value Date     2018    K 3.6 2018     2018    CO2 23 2018    BUN 41 2018    CREATININE 2.55 2018    GFRAA 21 2018    LABGLOM 18 2018    GLUCOSE 72 2018    PROT 7.7 2018    LABALBU

## 2018-12-24 NOTE — PROGRESS NOTES
[facility]   OCCUPATIONAL THERAPY MISSED TREATMENT NOTE   INPATIENT   Date: 18  Patient Name: Edilia Silva       Room: 3182/7261-57  MRN: [id number]   Account #: [de-identified]    : 1927  (80 y.o.)  Gender: female   Referring Practitioner: Dr. Amol Eid  Diagnosis: Acute renal failure             REASON FOR MISSED TREATMENT:  Patient unable to participate   -   Other - Pt OOR working with physical therapy department.           ALEX Howard

## 2018-12-24 NOTE — PROGRESS NOTES
per 100 WBC    Differential Type NOT REPORTED     Immature Granulocytes NOT REPORTED 0 %    Absolute Immature Granulocyte NOT REPORTED 0.00 - 0.30 k/uL    WBC Morphology NOT REPORTED     RBC Morphology NOT REPORTED     Platelet Estimate NOT REPORTED     Seg Neutrophils 69 (H) 36 - 66 %    Lymphocytes 12 (L) 24 - 44 %    Monocytes 15 (H) 1 - 7 %    Eosinophils % 3 0 - 4 %    Basophils 1 0 - 2 %    Segs Absolute 5.80 1.3 - 9.1 k/uL    Absolute Lymph # 1.00 1.0 - 4.8 k/uL    Absolute Mono # 1.20 0.1 - 1.3 k/uL    Absolute Eos # 0.30 0.0 - 0.4 k/uL    Basophils # 0.10 0.0 - 0.2 k/uL   Comprehensive Metabolic Panel   Result Value Ref Range    Glucose 71 70 - 99 mg/dL    BUN 83 (H) 8 - 23 mg/dL    CREATININE 4.24 (H) 0.50 - 0.90 mg/dL    Bun/Cre Ratio NOT REPORTED 9 - 20    Calcium 8.3 (L) 8.6 - 10.4 mg/dL    Sodium 139 135 - 144 mmol/L    Potassium 5.9 (H) 3.7 - 5.3 mmol/L    Chloride 109 (H) 98 - 107 mmol/L    CO2 20 20 - 31 mmol/L    Anion Gap 10 9 - 17 mmol/L    Alkaline Phosphatase 101 35 - 104 U/L    ALT 9 5 - 33 U/L    AST 22 <32 U/L    Total Bilirubin 0.26 (L) 0.3 - 1.2 mg/dL    Total Protein 9.3 (H) 6.4 - 8.3 g/dL    Alb 2.7 (L) 3.5 - 5.2 g/dL    Albumin/Globulin Ratio NOT REPORTED 1.0 - 2.5    GFR Non-African American 10 (L) >60 mL/min    GFR  12 (L) >60 mL/min    GFR Comment          GFR Staging NOT REPORTED    SODIUM, URINE, RANDOM   Result Value Ref Range    Sodium,Ur 74 mmol/L   POTASSIUM, URINE, RANDOM   Result Value Ref Range    Potassium, Ur 36.9 mmol/L   CREATININE, RANDOM URINE   Result Value Ref Range    Creatinine, Ur 33.5 28.0 - 217.0 mg/dL   Troponin   Result Value Ref Range    Troponin, High Sensitivity 96 (HH) 0 - 14 ng/L    Troponin T NOT REPORTED <0.03 ng/mL    Troponin Interp NOT REPORTED    Troponin   Result Value Ref Range    Troponin, High Sensitivity 89 (HH) 0 - 14 ng/L    Troponin T NOT REPORTED <0.03 ng/mL    Troponin Interp NOT REPORTED    Comprehensive Metabolic Panel w/ 3.5 - 5.2 g/dL    Albumin/Globulin Ratio NOT REPORTED 1.0 - 2.5    GFR Non-African American 13 (L) >60 mL/min    GFR  16 (L) >60 mL/min    GFR Comment          GFR Staging NOT REPORTED    CBC   Result Value Ref Range    WBC 7.7 3.5 - 11.0 k/uL    RBC 1.98 (L) 4.0 - 5.2 m/uL    Hemoglobin 6.9 (LL) 12.0 - 16.0 g/dL    Hematocrit 20.9 (L) 36 - 46 %    .5 (H) 80 - 100 fL    MCH 34.7 (H) 26 - 34 pg    MCHC 32.8 31 - 37 g/dL    RDW 20.4 (H) 11.5 - 14.9 %    Platelets 532 916 - 955 k/uL    MPV 7.1 6.0 - 12.0 fL    NRBC Automated NOT REPORTED per 100 WBC   Occ Bld, Fecal Scrn   Result Value Ref Range    Occult Blood, Stool #1 POSITIVE (A) NEG    Date, Stool #1 12/22/18     Time, Stool #1 UNKNOWN     Occult Blood, Stool #2 NOT REPORTED NEG    Date, Stool #2 NOT REPORTED     Time, Stool #2 NOT REPORTED     Occult Blood, Stool #3 NOT REPORTED NEG    Date, Stool #3 NOT REPORTED     Time, Stool #3 NOT REPORTED    Comprehensive Metabolic Panel w/ Reflex to MG   Result Value Ref Range    Glucose 74 70 - 99 mg/dL    BUN 52 (H) 8 - 23 mg/dL    CREATININE 2.99 (H) 0.50 - 0.90 mg/dL    Bun/Cre Ratio NOT REPORTED 9 - 20    Calcium 7.9 (L) 8.6 - 10.4 mg/dL    Sodium 142 135 - 144 mmol/L    Potassium 4.4 3.7 - 5.3 mmol/L    Chloride 113 (H) 98 - 107 mmol/L    CO2 19 (L) 20 - 31 mmol/L    Anion Gap 10 9 - 17 mmol/L    Alkaline Phosphatase 84 35 - 104 U/L    ALT 8 5 - 33 U/L    AST 24 <32 U/L    Total Bilirubin 0.43 0.3 - 1.2 mg/dL    Total Protein 8.5 (H) 6.4 - 8.3 g/dL    Alb 2.4 (L) 3.5 - 5.2 g/dL    Albumin/Globulin Ratio NOT REPORTED 1.0 - 2.5    GFR Non-African American 15 (L) >60 mL/min    GFR  18 (L) >60 mL/min    GFR Comment          GFR Staging NOT REPORTED    CBC   Result Value Ref Range    WBC 7.8 3.5 - 11.0 k/uL    RBC 2.78 (L) 4.0 - 5.2 m/uL    Hemoglobin 9.1 (L) 12.0 - 16.0 g/dL    Hematocrit 26.9 (L) 36 - 46 %    MCV 96.9 80 - 100 fL    MCH 32.6 26 - 34 pg    MCHC 33.7 31 - 37 g/dL RDW 21.2 (H) 11.5 - 14.9 %    Platelets 919 459 - 568 k/uL    MPV 6.9 6.0 - 12.0 fL    NRBC Automated NOT REPORTED per 100 WBC   Urinalysis with Microscopic   Result Value Ref Range    Color, UA YELLOW YEL    Turbidity UA CLOUDY (A) CLEAR    Glucose, Ur NEGATIVE NEG    Bilirubin Urine NEGATIVE NEG    Ketones, Urine NEGATIVE NEG    Specific Gravity, UA 1.009 1.000 - 1.030    Urine Hgb LARGE (A) NEG    pH, UA 6.0 5.0 - 8.0    Protein, UA 1+ (A) NEG    Urobilinogen, Urine Normal NORM    Nitrite, Urine NEGATIVE NEG    Leukocyte Esterase, Urine MOD (A) NEG    Urinalysis Comments NOT REPORTED     -          WBC, UA 20 TO 50 /HPF    RBC, UA 50  /HPF    Casts UA 5 TO 10 /LPF    Crystals UA NOT REPORTED NONE /HPF    Epithelial Cells UA 2 TO 5 /HPF    Renal Epithelial, Urine NOT REPORTED 0 /HPF    Bacteria, UA FEW (A) NONE    Mucus, UA NOT REPORTED NONE    Trichomonas, UA NOT REPORTED NONE    Amorphous, UA NOT REPORTED NONE    Other Observations UA NOT REPORTED NREQ    Yeast, UA NOT REPORTED NONE   Electrolytes urine random   Result Value Ref Range    Sodium,Ur 98 mmol/L    Potassium, Ur 16.7 mmol/L    Chloride, Ur 90 mmol/L   Creatinine, Random Urine   Result Value Ref Range    Creatinine, Ur 20.6 (L) 28.0 - 217.0 mg/dL   Iron and TIBC   Result Value Ref Range    Iron 110 37 - 145 ug/dL    TIBC 170 (L) 250 - 450 ug/dL    Iron Saturation 65 (H) 20 - 55 %    UIBC 60 (L) 112 - 347 ug/dL   Ferritin   Result Value Ref Range    Ferritin 313 (H) 13 - 150 ug/L   CK   Result Value Ref Range    Total CK 28 26 - 192 U/L   Urinalysis   Result Value Ref Range    Color, UA YELLOW YEL    Turbidity UA CLEAR CLEAR    Glucose, Ur NEGATIVE NEG    Bilirubin Urine NEGATIVE NEG    Ketones, Urine NEGATIVE NEG    Specific [place], UA 1.010 1.000 - 1.030    Urine Hgb TRACE (A) NEG    pH, UA 7.0 5.0 - 8.0    Protein, UA 1+ (A) NEG    Urobilinogen, Urine Normal NORM    Nitrite, Urine NEGATIVE NEG    Leukocyte Esterase, Urine SMALL (A) NEG

## 2018-12-24 NOTE — DISCHARGE INSTR - COC
from Last 1 Encounters:   18 103 lb 9.9 oz (47 kg)     Mental Status:  {IP PT MENTAL STATUS:}    IV Access:  { CONG IV ACCESS:591512315}    Nursing Mobility/ADLs:  Walking   {P DME AKBN:677977916}  Transfer  {CHP DME FBYJ:797672949}  Bathing  {P DME YWOS:104321827}  Dressing  {P DME FKPI:023515235}  Toileting  {P DME ZYEZ:091734326}  Feeding  {P DME TQXA:373486636}  Med Admin  {P DME EFJO:939985830}  Med Delivery   { CONG MED Delivery:280777882}    Wound Care Documentation and Therapy:        Elimination:  Continence:   · Bowel: {YES / VM:78589}  · Bladder: {YES / RS:83314}  Urinary Catheter: {Urinary Catheter:400154770}   Colostomy/Ileostomy/Ileal Conduit: {YES / SW:82856}       Date of Last BM: ***    Intake/Output Summary (Last 24 hours) at 18 1204  Last data filed at 18 0614   Gross per 24 hour   Intake             2567 ml   Output             1900 ml   Net              667 ml     I/O last 3 completed shifts: In: 2567 [P.O.:250; I.V.:2317]  Out: 1900 [Urine:1900]    Safety Concerns:     { CONG Safety Concerns:741961462}    Impairments/Disabilities:      { CONG Impairments/Disabilities:387381505}    Nutrition Therapy:  Current Nutrition Therapy:   [facility] CONG Diet List:523964554}    Routes of Feeding: {[facility] DME Other Feedings:487570093}  Liquids: {Slp liquid thickness:60787}  Daily Fluid Restriction: {[facility] DME Yes amt example:685995634}  Last Modified Barium Swallow with Video (Video Swallowing Test): {Done Not Done GPSO:799624881}    Treatments at the Time of Hospital Discharge:   Respiratory Treatments: ***  Oxygen Therapy:  {Therapy; copd oxygen:88096}  Ventilator:    { CC Vent PZL}    Rehab Therapies: Physical Therapy and Occupational Therapy  Weight Bearing Status/Restrictions: No weight bearing restirctions  Other Medical Equipment (for information only, NOT a DME order):  {EQUIPMENT:005577134}  Other Treatments: Skilled Nursing Assessment per Protocol.

## 2018-12-24 NOTE — PROGRESS NOTES
Pt left unit via ambulance to return to [facility] living. Explained to pts daughter that she needs to follow up with a urologist OP asap because her mother still has a muñoz in place and will be in assisted living where she does have around the clock nursing care.

## 2018-12-24 NOTE — PROGRESS NOTES
NEPHROLOGY PTOGRESS NOTE      Reason for Consult:  Acute kidney injury      Chief Complaint:  Weakness  History Obtained From: History is obtained from [facility] [place] records as the patient has dementia and cannot give any detailed history    History of Present Illness: This is a 80 y.o. female with history of dementia, CKD, recent history of AK I with serum creatinine of 1.89 on 11/22/18   She is resident at a nursing home and was referred with abnormal labs. Upon presentation, she had acute hyperkalemia of 5.9 with serum creatinine of 4.29 mg/dl. In the ER,  she was noted to have post void residual of 400 mls and a  Renee catheter was placed. There was no documented history of vomiting, diarrhea she apparently has had poor oral intake and intermittently refuses food. She was managed medically for hyperkalemia and serum sodium is 14.7 today and serum creatinine has improved to 3.2 with IV fluids. She has good urine output. A renal ultrasound upon presentation did not show any hydronephrosis or obstruction  Review of her medications does not indicate use of potassium sparing medication or ACE inhibitor or ARB. Subjective/interval history:    Patient was seen and examined at bedside, she was lying down comfortably in bed. We had a detailed conversation with the patient's daughter regarding discharge planning. Decision was made to continue follow these as it was thought to have been for a bladder obstruction. The patient was told to follow-up with urology on discharge. Patient was also given instructions on diet, she was told that we were lifting dietary restrictions and since the patient was generally not eating well this would allow her to eat whatever she did light.     Past Medical History:        Diagnosis Date    Anemia     Dementia     Glaucoma     Other symbolic dysfunctions        Past Surgical History:        Procedure Laterality Date    EYE SURGERY      HIP ARTHROPLASTY         Current PERCENTFE  TIBC:    Lab Results   Component Value Date    TIBC 170 12/22/2018     FERRITIN:    Lab Results   Component Value Date    FERRITIN 313 12/22/2018     SPEP:   Lab Results   Component Value Date    PROT 8.4 12/24/2018     UPEP: No results found for: TPU     C3: No results found for: C3  C4: No results found for: C4  MPO ANCA:  No results found for: MPO . PR3 ANCA:  No results found for: PR3  Urine Sodium:    Lab Results   Component Value Date    HOLDEN 98 12/22/2018      Urine Potassium:    Lab Results   Component Value Date    KUR 16.7 12/22/2018     Urine Chloride:  No components found for: CLU  Urine Ph:  No components found for: PO4U  Urine Osmolarity:  No results found for: OSMOU  Urine Creatinine:    Lab Results   Component Value Date    LABCREA 20.6 12/22/2018     Urine Eosinophils: No components found for: EOSU  Urine Protein:  No results found for: TPU  Urinalysis:  U/A:   Lab Results   Component Value Date    NITRU NEGATIVE 12/23/2018    COLORU YELLOW 12/23/2018    PHUR 7.0 12/23/2018    WBCUA 2 TO 5 12/23/2018    RBCUA 2 TO 5 12/23/2018    MUCUS NOT REPORTED 12/23/2018    TRICHOMONAS NOT REPORTED 12/23/2018    YEAST NOT REPORTED 12/23/2018    BACTERIA FEW 12/23/2018    SPECGRAV 1.010 12/23/2018    LEUKOCYTESUR SMALL 12/23/2018    UROBILINOGEN Normal 12/23/2018    BILIRUBINUR NEGATIVE 12/23/2018    GLUCOSEU NEGATIVE 12/23/2018    [address] NEGATIVE 12/23/2018    AMORPHOUS NOT REPORTED 12/23/2018         Radiology:  Reviewed as available. Assessment:  1. Acute kidney injury related to prerenal azotemia from poor oral intake Leading to ischemic ATN , complicated by urinary retention-currently nonoliguric and improvement in urine output , serum creatinine improving  Renal ultrasound negative for any obstruction consistent with renal parenchymal echogenicity chronic medical renal disease  2. Acute hyperkalemia secondary to decreased GFR, urinary retention-improved  3.   Hyperchloremic metabolic

## 2018-12-24 NOTE — PROGRESS NOTES
Spoke with pts Daughter in the room and Son on the phone. Both children Refused for pt to go to a skilled facility. Pt worked with PT and is unsafe to be alone. Educated family on pts risk of falls and that she wasn't safe to live on her on in assisted living. Family states that they understood the risk and wanted her to still go back to assisted living. Daughter states that her mom does everything at her own pace and there is nothing wrong with her ability to get around on her own.

## 2018-12-24 NOTE — PROGRESS NOTES
[address] [facility].    Date:   12/24/2018  Patient name:  Misti Cottrell  Date of admission:  12/19/2018  6:40 PM  MRN:   [id number]  YOB: 1927    CC- Ravinder on ckd    HPI- Denies any issues. Potassium 3.4, magnesium 1.4. REVIEW OF SYSTEMS:    · General----negative for fatigue, weight loss  · GI negative for nausea and vomiting, no dysphagia       EXAM-  BP (!) 150/69   Pulse 65   Temp 98.4 °F (36.9 °C) (Oral)   Resp 16   Ht 5' 4\" (1.626 m)   Wt 103 lb 9.9 oz (47 kg)   SpO2 97%   BMI 17.79 kg/m²      · General appearance: NAD conversant  · Lungs: normal effort, clear anterior posterior upper, diminished posterior bases  · Heart: regular rate and rhythm, S1, S2 normal, no murmur  · Abdomen: soft, non-tender; no masses, no organomegaly  · Extremities: no cyanosis, no edema no clubbing no synovitis      Laboratory Testing:  CBC:   Recent Labs      12/24/18   0651   WBC  7.9   HGB  8.7*   PLT  175     BMP:    Recent Labs      12/22/18   0549  12/23/18   0517  12/24/18   0651   NA  142  144  141   K  4.4  3.6*  3.4*   CL  113*  112*  108*   CO2  19*  23  23   BUN  52*  41*  33*   CREATININE  2.99*  2.55*  2.47*   GLUCOSE  74  72  79         ASSESSMENT:    Patient Active Problem List   Diagnosis    Cachexia ([facility] [address]    Urinary incontinence    Incontinence of feces    Dementia without behavioral disturbance    Pressure injury of buttock, stage 2    Acute renal failure (HCC)    Elevated troponin    Dehydration    Acute hyperkalemia    Weight loss    Occult blood in stools    Absolute anemia       PLAN: ravinder on ckd iv fluids per nephrology, cr 2.47, monitor lab. Urinary retention muñoz catheter   GI bleed hgb stable 8.7, GI following. Social work for [facility]. Chasidy Stevens, RN nurse practitioner student  ESMER LABOY [address], [address] [place] [address].    Phone [phone]   Fax: [phone])

## 2018-12-31 NOTE — PROGRESS NOTES
\"taking\" at this time  Outpatient Prescriptions Marked as Taking for the 12/31/18 encounter (Office Visit) with Yocasta Jorge, JELENA Mercado CNP   Medication Sig Dispense Refill    levothyroxine (SYNTHROID) 25 MCG tablet Take 1 tablet by mouth Daily 30 tablet 3    magnesium oxide (MAG-OX) 400 (241.3 Mg) MG TABS tablet Take 1 tablet by mouth daily 30 tablet 3    Cranberry-Vitamin C-Inulin (UTI-STAT) LIQD Take 30 mLs by mouth 2 times daily      senna (SENOKOT) 8.6 MG tablet Take 1 tablet by mouth 2 times daily      memantine (NAMENDA) 5 MG tablet Take 1 tablet by mouth 2 times daily 60 tablet 3    vitamin B-12 (CYANOCOBALAMIN) 1000 MCG tablet Take 1,000 mcg by mouth every other day       latanoprost (XALATAN) 0.005 % ophthalmic solution Place 1 drop into both eyes nightly      brimonidine (ALPHAGAN) 0.2 % ophthalmic solution Place 1 drop into both eyes 2 times daily          Medications patient taking as of now reconciled against medications ordered at time of hospital discharge: Yes    Chief Complaint   Patient presents with    Follow-Up from Hospital     Patient is here for a follow up from the hospital for acute renal failure. HPI  She has had several good days  Slept yesterday and did not eat well yesterday    Inpatient course: Discharge summary reviewed- see chart. Interval history/Current status: Patient has stabilized. Weight is a little down. She is probably not drinking as much as she should. Nurses are administering medication  She has no complaints  She did slide down to floor when transferring from chair to chair    Review of Systems   Constitutional: Positive for fatigue. Negative for appetite change, chills and unexpected weight change. HENT: Negative for congestion, ear pain, nosebleeds and sore throat. Eyes: Negative for photophobia and visual disturbance. Respiratory: Negative for cough and shortness of breath.     Cardiovascular: Negative for chest pain, palpitations and leg swelling. Gastrointestinal: Negative for constipation, diarrhea and nausea. Endocrine: Negative for polyphagia and polyuria. Genitourinary: Negative for difficulty urinating and dysuria. Musculoskeletal: Positive for gait problem. Negative for back pain. Skin: Positive for rash. Negative for wound. Neurological: Negative for dizziness, light-headedness and headaches. Hematological: Negative for adenopathy. Does not bruise/bleed easily. Psychiatric/Behavioral: Negative for dysphoric mood and sleep disturbance. The patient is not nervous/anxious. Vitals:    12/31/18 1145   BP: 90/60   Pulse: 70   Resp: 15   SpO2: 94%   Weight: 92 lb (41.7 kg)     Body mass index is 15.79 kg/m². Wt Readings from Last 3 Encounters:   12/31/18 92 lb (41.7 kg)   12/24/18 103 lb 9.9 oz (47 kg)   12/17/18 99 lb (44.9 kg)     BP Readings from Last 3 Encounters:   12/31/18 90/60   12/24/18 132/68   12/17/18 112/62       Physical Exam   Constitutional: She appears cachectic. HENT:   Head: Normocephalic and atraumatic. Right Ear: Hearing normal.   Left Ear: Hearing normal.   Nose: Nose normal. Right sinus exhibits no maxillary sinus tenderness and no frontal sinus tenderness. Left sinus exhibits no maxillary sinus tenderness and no frontal sinus tenderness. Mouth/Throat: Mucous membranes are dry. Abnormal dentition. Moderate amount of cerumen bilateral   Eyes: Pupils are equal, round, and reactive to light. EOM are normal.   Neck: Normal range of motion. Neck supple. No tracheal deviation present. No thyromegaly present. Cardiovascular: Normal rate and regular rhythm. Murmur heard. No carotid bruit     Pulmonary/Chest: Effort normal and breath sounds normal.   Abdominal: Soft. Bowel sounds are normal. She exhibits no distension. Musculoskeletal: She exhibits no edema. Uses wheelchair for mobility   Neurological: She is alert. No cranial nerve deficit. Skin: Skin is warm and dry.  Capillary refill

## 2019-01-01 ENCOUNTER — OFFICE VISIT (OUTPATIENT)
Dept: UROLOGY | Age: 84
End: 2019-01-01
Payer: MEDICARE

## 2019-01-01 VITALS — DIASTOLIC BLOOD PRESSURE: 71 MMHG | HEART RATE: 62 BPM | SYSTOLIC BLOOD PRESSURE: 123 MMHG

## 2019-01-01 DIAGNOSIS — R33.9 URINARY RETENTION: Primary | ICD-10-CM

## 2019-01-01 DIAGNOSIS — N28.9 RENAL INSUFFICIENCY: ICD-10-CM

## 2019-01-01 LAB
BASOPHILS ABSOLUTE: 0 /ΜL
BASOPHILS ABSOLUTE: 0 /ΜL
BASOPHILS ABSOLUTE: 0.1 /ΜL
BASOPHILS RELATIVE PERCENT: 0.3 %
BASOPHILS RELATIVE PERCENT: 0.5 %
BASOPHILS RELATIVE PERCENT: 0.6 %
BUN BLDV-MCNC: 49 MG/DL
BUN BLDV-MCNC: 81 MG/DL
CALCIUM SERPL-MCNC: 7.9 MG/DL
CALCIUM SERPL-MCNC: 8.3 MG/DL
CHLORIDE BLD-SCNC: 105 MMOL/L
CHLORIDE BLD-SCNC: 106 MMOL/L
CO2: 24 MMOL/L
CO2: 25 MMOL/L
CREAT SERPL-MCNC: 4.7 MG/DL
CREAT SERPL-MCNC: 8.3 MG/DL
EOSINOPHILS ABSOLUTE: 0.1 /ΜL
EOSINOPHILS ABSOLUTE: 0.1 /ΜL
EOSINOPHILS ABSOLUTE: 0.2 /ΜL
EOSINOPHILS RELATIVE PERCENT: 1.6 %
EOSINOPHILS RELATIVE PERCENT: 1.6 %
EOSINOPHILS RELATIVE PERCENT: 2 %
GFR CALCULATED: 4
GFR CALCULATED: NORMAL
GLUCOSE BLD-MCNC: 101 MG/DL
GLUCOSE BLD-MCNC: 94 MG/DL
HCT VFR BLD CALC: 22.1 % (ref 36–46)
HCT VFR BLD CALC: 22.7 % (ref 36–46)
HCT VFR BLD CALC: 25.1 % (ref 36–46)
HEMOGLOBIN: 7.1 G/DL (ref 12–16)
HEMOGLOBIN: 7.2 G/DL (ref 12–16)
HEMOGLOBIN: 7.8 G/DL (ref 12–16)
LYMPHOCYTES ABSOLUTE: 0.7 /ΜL
LYMPHOCYTES ABSOLUTE: 0.9 /ΜL
LYMPHOCYTES ABSOLUTE: 1 /ΜL
LYMPHOCYTES RELATIVE PERCENT: 10.3 %
LYMPHOCYTES RELATIVE PERCENT: 11.5 %
LYMPHOCYTES RELATIVE PERCENT: 8.5 %
MCH RBC QN AUTO: 32.7 PG
MCH RBC QN AUTO: 32.7 PG
MCH RBC QN AUTO: 32.8 PG
MCHC RBC AUTO-ENTMCNC: 31.1 G/DL
MCHC RBC AUTO-ENTMCNC: 31.7 G/DL
MCHC RBC AUTO-ENTMCNC: 32.1 G/DL
MCV RBC AUTO: 101.8 FL
MCV RBC AUTO: 103.2 FL
MCV RBC AUTO: 105.5 FL
MONOCYTES ABSOLUTE: 0.8 /ΜL
MONOCYTES ABSOLUTE: 1 /ΜL
MONOCYTES ABSOLUTE: 1.2 /ΜL
MONOCYTES RELATIVE PERCENT: 10.3 %
MONOCYTES RELATIVE PERCENT: 11.3 %
MONOCYTES RELATIVE PERCENT: 13.1 %
NEUTROPHILS ABSOLUTE: 6.3 /ΜL
NEUTROPHILS ABSOLUTE: 6.6 /ΜL
NEUTROPHILS ABSOLUTE: 6.7 /ΜL
NEUTROPHILS RELATIVE PERCENT: 73.8 %
NEUTROPHILS RELATIVE PERCENT: 74.4 %
NEUTROPHILS RELATIVE PERCENT: 78 %
PDW BLD-RTO: 19.9 %
PDW BLD-RTO: 32.1 %
PLATELET # BLD: 229 K/ΜL
PLATELET # BLD: 244 K/ΜL
PLATELET # BLD: 256 K/ΜL
PMV BLD AUTO: 10.5 FL
PMV BLD AUTO: 9.8 FL
PMV BLD AUTO: ABNORMAL FL
POTASSIUM SERPL-SCNC: 4.8 MMOL/L
POTASSIUM SERPL-SCNC: 5 MMOL/L
RBC # BLD: 2.17 10^6/ΜL
RBC # BLD: 2.2 10^6/ΜL
RBC # BLD: 2.38 10^6/ΜL
SODIUM BLD-SCNC: 134 MMOL/L
SODIUM BLD-SCNC: 135 MMOL/L
WBC # BLD: 8.1 10^3/ML
WBC # BLD: 8.96 10^3/ML
WBC # BLD: 9 10^3/ML

## 2019-01-01 PROCEDURE — 1111F DSCHRG MED/CURRENT MED MERGE: CPT | Performed by: UROLOGY

## 2019-01-01 PROCEDURE — G8427 DOCREV CUR MEDS BY ELIG CLIN: HCPCS | Performed by: UROLOGY

## 2019-01-01 PROCEDURE — 1090F PRES/ABSN URINE INCON ASSESS: CPT | Performed by: UROLOGY

## 2019-01-01 PROCEDURE — 4040F PNEUMOC VAC/ADMIN/RCVD: CPT | Performed by: UROLOGY

## 2019-01-01 PROCEDURE — 1101F PT FALLS ASSESS-DOCD LE1/YR: CPT | Performed by: UROLOGY

## 2019-01-01 PROCEDURE — G8419 CALC BMI OUT NRM PARAM NOF/U: HCPCS | Performed by: UROLOGY

## 2019-01-01 PROCEDURE — 99204 OFFICE O/P NEW MOD 45 MIN: CPT | Performed by: UROLOGY

## 2019-01-01 PROCEDURE — 1036F TOBACCO NON-USER: CPT | Performed by: UROLOGY

## 2019-01-01 PROCEDURE — G8482 FLU IMMUNIZE ORDER/ADMIN: HCPCS | Performed by: UROLOGY

## 2019-01-01 PROCEDURE — 1123F ACP DISCUSS/DSCN MKR DOCD: CPT | Performed by: UROLOGY

## 2019-01-01 ASSESSMENT — ENCOUNTER SYMPTOMS
VOMITING: 0
EYE PAIN: 0
NAUSEA: 0
EYE REDNESS: 0
SHORTNESS OF BREATH: 0
COLOR CHANGE: 0
ABDOMINAL PAIN: 0
BACK PAIN: 0
WHEEZING: 0
COUGH: 0

## 2019-01-18 PROBLEM — E86.0 DEHYDRATION: Status: RESOLVED | Noted: 2018-01-01 | Resolved: 2019-01-01

## 2019-01-18 PROBLEM — R77.8 ELEVATED TROPONIN: Status: RESOLVED | Noted: 2018-01-01 | Resolved: 2019-01-01
